# Patient Record
Sex: MALE | Race: ASIAN | NOT HISPANIC OR LATINO | URBAN - METROPOLITAN AREA
[De-identification: names, ages, dates, MRNs, and addresses within clinical notes are randomized per-mention and may not be internally consistent; named-entity substitution may affect disease eponyms.]

---

## 2020-06-12 ENCOUNTER — EMERGENCY (EMERGENCY)
Facility: HOSPITAL | Age: 55
LOS: 1 days | Discharge: HOME | End: 2020-06-12
Attending: EMERGENCY MEDICINE
Payer: MEDICAID

## 2020-06-12 VITALS
RESPIRATION RATE: 19 BRPM | SYSTOLIC BLOOD PRESSURE: 161 MMHG | TEMPERATURE: 98 F | OXYGEN SATURATION: 95 % | HEART RATE: 77 BPM | DIASTOLIC BLOOD PRESSURE: 94 MMHG

## 2020-06-12 VITALS — WEIGHT: 162.04 LBS

## 2020-06-12 LAB
ALBUMIN SERPL ELPH-MCNC: 3.8 G/DL — SIGNIFICANT CHANGE UP (ref 3.5–5)
ALP SERPL-CCNC: 67 U/L — SIGNIFICANT CHANGE UP (ref 40–120)
ALT FLD-CCNC: 105 U/L DA — HIGH (ref 10–60)
ANION GAP SERPL CALC-SCNC: 9 MMOL/L — SIGNIFICANT CHANGE UP (ref 5–17)
AST SERPL-CCNC: 45 U/L — HIGH (ref 10–40)
BASOPHILS # BLD AUTO: 0.03 K/UL — SIGNIFICANT CHANGE UP (ref 0–0.2)
BASOPHILS NFR BLD AUTO: 0.4 % — SIGNIFICANT CHANGE UP (ref 0–2)
BILIRUB SERPL-MCNC: 0.4 MG/DL — SIGNIFICANT CHANGE UP (ref 0.2–1.2)
BUN SERPL-MCNC: 20 MG/DL — HIGH (ref 7–18)
CALCIUM SERPL-MCNC: 8.9 MG/DL — SIGNIFICANT CHANGE UP (ref 8.4–10.5)
CHLORIDE SERPL-SCNC: 107 MMOL/L — SIGNIFICANT CHANGE UP (ref 96–108)
CO2 SERPL-SCNC: 26 MMOL/L — SIGNIFICANT CHANGE UP (ref 22–31)
CREAT SERPL-MCNC: 1.11 MG/DL — SIGNIFICANT CHANGE UP (ref 0.5–1.3)
EOSINOPHIL # BLD AUTO: 0.01 K/UL — SIGNIFICANT CHANGE UP (ref 0–0.5)
EOSINOPHIL NFR BLD AUTO: 0.1 % — SIGNIFICANT CHANGE UP (ref 0–6)
GLUCOSE SERPL-MCNC: 125 MG/DL — HIGH (ref 70–99)
HCT VFR BLD CALC: 46.6 % — SIGNIFICANT CHANGE UP (ref 39–50)
HGB BLD-MCNC: 16.2 G/DL — SIGNIFICANT CHANGE UP (ref 13–17)
IMM GRANULOCYTES NFR BLD AUTO: 0.2 % — SIGNIFICANT CHANGE UP (ref 0–1.5)
LYMPHOCYTES # BLD AUTO: 1.35 K/UL — SIGNIFICANT CHANGE UP (ref 1–3.3)
LYMPHOCYTES # BLD AUTO: 15.8 % — SIGNIFICANT CHANGE UP (ref 13–44)
MCHC RBC-ENTMCNC: 29.7 PG — SIGNIFICANT CHANGE UP (ref 27–34)
MCHC RBC-ENTMCNC: 34.8 GM/DL — SIGNIFICANT CHANGE UP (ref 32–36)
MCV RBC AUTO: 85.5 FL — SIGNIFICANT CHANGE UP (ref 80–100)
MONOCYTES # BLD AUTO: 0.35 K/UL — SIGNIFICANT CHANGE UP (ref 0–0.9)
MONOCYTES NFR BLD AUTO: 4.1 % — SIGNIFICANT CHANGE UP (ref 2–14)
NEUTROPHILS # BLD AUTO: 6.79 K/UL — SIGNIFICANT CHANGE UP (ref 1.8–7.4)
NEUTROPHILS NFR BLD AUTO: 79.4 % — HIGH (ref 43–77)
NRBC # BLD: 0 /100 WBCS — SIGNIFICANT CHANGE UP (ref 0–0)
PLATELET # BLD AUTO: 171 K/UL — SIGNIFICANT CHANGE UP (ref 150–400)
POTASSIUM SERPL-MCNC: 4 MMOL/L — SIGNIFICANT CHANGE UP (ref 3.5–5.3)
POTASSIUM SERPL-SCNC: 4 MMOL/L — SIGNIFICANT CHANGE UP (ref 3.5–5.3)
PROT SERPL-MCNC: 8 G/DL — SIGNIFICANT CHANGE UP (ref 6–8.3)
RBC # BLD: 5.45 M/UL — SIGNIFICANT CHANGE UP (ref 4.2–5.8)
RBC # FLD: 13.1 % — SIGNIFICANT CHANGE UP (ref 10.3–14.5)
SODIUM SERPL-SCNC: 142 MMOL/L — SIGNIFICANT CHANGE UP (ref 135–145)
TROPONIN I SERPL-MCNC: <0.015 NG/ML — SIGNIFICANT CHANGE UP (ref 0–0.04)
WBC # BLD: 8.55 K/UL — SIGNIFICANT CHANGE UP (ref 3.8–10.5)
WBC # FLD AUTO: 8.55 K/UL — SIGNIFICANT CHANGE UP (ref 3.8–10.5)

## 2020-06-12 PROCEDURE — 99283 EMERGENCY DEPT VISIT LOW MDM: CPT

## 2020-06-12 PROCEDURE — 83880 ASSAY OF NATRIURETIC PEPTIDE: CPT

## 2020-06-12 PROCEDURE — 71045 X-RAY EXAM CHEST 1 VIEW: CPT | Mod: 26

## 2020-06-12 PROCEDURE — 82553 CREATINE MB FRACTION: CPT

## 2020-06-12 PROCEDURE — 99283 EMERGENCY DEPT VISIT LOW MDM: CPT | Mod: 25

## 2020-06-12 PROCEDURE — 85027 COMPLETE CBC AUTOMATED: CPT

## 2020-06-12 PROCEDURE — 93010 ELECTROCARDIOGRAM REPORT: CPT

## 2020-06-12 PROCEDURE — 36415 COLL VENOUS BLD VENIPUNCTURE: CPT

## 2020-06-12 PROCEDURE — 94640 AIRWAY INHALATION TREATMENT: CPT

## 2020-06-12 PROCEDURE — 84484 ASSAY OF TROPONIN QUANT: CPT

## 2020-06-12 PROCEDURE — 71045 X-RAY EXAM CHEST 1 VIEW: CPT

## 2020-06-12 PROCEDURE — 93005 ELECTROCARDIOGRAM TRACING: CPT

## 2020-06-12 PROCEDURE — 80053 COMPREHEN METABOLIC PANEL: CPT

## 2020-06-12 RX ORDER — IPRATROPIUM/ALBUTEROL SULFATE 18-103MCG
3 AEROSOL WITH ADAPTER (GRAM) INHALATION ONCE
Refills: 0 | Status: COMPLETED | OUTPATIENT
Start: 2020-06-12 | End: 2020-06-12

## 2020-06-12 RX ADMIN — Medication 3 MILLILITER(S): at 23:32
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
## 2020-06-12 NOTE — ED PROVIDER NOTE - CLINICAL SUMMARY MEDICAL DECISION MAKING FREE TEXT BOX
54 year old male with SOB and CP. vitals WNL. PE as above. 54 year old male with SOB and CP. vitals WNL. PE as above.  ecg no acute ischemic changes. cxr unremarkable. labs unremarkable. given duoneb- feels improved. vitals unremarkable. will dc. f/u with pulm. return precautions discussed.

## 2020-06-12 NOTE — ED PROVIDER NOTE - PATIENT PORTAL LINK FT
You can access the FollowMyHealth Patient Portal offered by [facility] by registering at the following website: [email or web address]. By joining [employer]’s FollowMyHealth portal, you will also be able to view your health information using other applications (apps) compatible with our system.

## 2020-06-12 NOTE — ED PROVIDER NOTE - PROGRESS NOTE DETAILS
feels improved. no wheezing. states that cp/back pain was going to PT for, but hasn't been to PT 2/2 pandemic.

## 2020-06-12 NOTE — ED PROVIDER NOTE - CONSTITUTIONAL, MLM
Form completed and emailed to you    Please have dr Pinedo Safe sign and please fax to the [facility] normal... Well appearing, awake, alert, oriented to person, place, time/situation and in no apparent distress.

## 2020-06-13 LAB
CK MB CFR SERPL CALC: <1 NG/ML — SIGNIFICANT CHANGE UP (ref 0–3.6)
NT-PROBNP SERPL-SCNC: 6 PG/ML — SIGNIFICANT CHANGE UP (ref 0–125)

## 2021-11-18 ENCOUNTER — EMERGENCY (EMERGENCY)
Facility: HOSPITAL | Age: 56
LOS: 1 days | Discharge: HOME | End: 2021-11-18
Attending: EMERGENCY MEDICINE
Payer: MEDICAID

## 2021-11-18 VITALS
TEMPERATURE: 98 F | RESPIRATION RATE: 22 BRPM | HEIGHT: 70 IN | HEART RATE: 75 BPM | OXYGEN SATURATION: 96 % | DIASTOLIC BLOOD PRESSURE: 91 MMHG | WEIGHT: 175.05 LBS | SYSTOLIC BLOOD PRESSURE: 135 MMHG

## 2021-11-18 PROCEDURE — 99284 EMERGENCY DEPT VISIT MOD MDM: CPT

## 2021-11-18 PROCEDURE — 71045 X-RAY EXAM CHEST 1 VIEW: CPT | Mod: 26

## 2021-11-18 PROCEDURE — 99283 EMERGENCY DEPT VISIT LOW MDM: CPT | Mod: 25

## 2021-11-18 PROCEDURE — 71045 X-RAY EXAM CHEST 1 VIEW: CPT

## 2021-11-18 RX ORDER — DEXTROMETHORPHAN HYDROBROMIDE AND PROMETHAZINE HYDROCHLORIDE 15; 6.25 MG/5ML; MG/5ML
5 SYRUP ORAL
Qty: 100 | Refills: 0
Start: 2021-11-18 | End: 2021-11-22

## 2021-11-18 RX ADMIN — Medication 100 MILLIGRAM(S): at 06:36

## 2021-11-18 NOTE — ED PROVIDER NOTE - OBJECTIVE STATEMENT
56 year old male PMH asthma coming in with 10 days of a nonproductive cough. pt saw his PCP and took a full course of azithromyin and prednisone but still with a cough. states has also been using nebulizers at home without improvement of the cough. denies cp, sob, palpitations, abd pains, n/V/d/C, fevers, chills, sweats, uri symptoms. only took 1 dose of cough medication at home.

## 2021-11-18 NOTE — ED PROVIDER NOTE - NSFOLLOWUPINSTRUCTIONS_ED_ALL_ED_FT
Log Out.      Parchment CareNotes®     :  [facility]  	                       ACUTE BRONCHITIS - AfterCare(R) Instructions(ER/ED)           Acute Bronchitis    WHAT YOU NEED TO KNOW:    Acute bronchitis is swelling and irritation in your lungs. It is usually caused by a virus and most often happens in the winter. Bronchitis may also be caused by bacteria or by a chemical irritant, such as smoke.    DISCHARGE INSTRUCTIONS:    Return to the emergency department if:   •You cough up blood.      •Your lips or fingernails turn blue.      •You feel like you are not getting enough air when you breathe.      Call your doctor if:   •Your symptoms do not go away or get worse, even after treatment.      •Your cough does not get better within 4 weeks.      •You have questions or concerns about your condition or care.      Medicines: You may need any of the following:   •Cough suppressants decrease your urge to cough.       •Decongestants help loosen mucus in your lungs and make it easier to cough up. This can help you breathe easier.      •Inhalers may be given. Your healthcare provider may give you one or more inhalers to help you breathe easier and cough less. An inhaler gives your medicine to open your airways. Ask your healthcare provider to show you how to use your inhaler correctly.  Metered Dose Inhaler           •Antibiotics may be given for up to 5 days if your bronchitis is caused by bacteria.      •Acetaminophen decreases pain and fever. It is available without a doctor's order. Ask how much to take and how often to take it. Follow directions. Read the labels of all other medicines you are using to see if they also contain acetaminophen, or ask your doctor or pharmacist. Acetaminophen can cause liver damage if not taken correctly. Do not use more than 4 grams (4,000 milligrams) total of acetaminophen in one day.       •NSAIDs help decrease swelling and pain or fever. This medicine is available with or without a doctor's order. NSAIDs can cause stomach bleeding or kidney problems in certain people. If you take blood thinner medicine, always ask your healthcare provider if NSAIDs are safe for you. Always read the medicine label and follow directions.      •Take your medicine as directed. Contact your healthcare provider if you think your medicine is not helping or if you have side effects. Tell him of her if you are allergic to any medicine. Keep a list of the medicines, vitamins, and herbs you take. Include the amounts, and when and why you take them. Bring the list or the pill bottles to follow-up visits. Carry your medicine list with you in case of an emergency.      Self-care:   •Drink liquids as directed. You may need to drink more liquids than usual to stay hydrated. Ask how much liquid to drink each day and which liquids are best for you.      •Use a cool mist humidifier to increase air moisture in your home. This may make it easier for you to breathe and help decrease your cough.       •Get more rest. Rest helps your body to heal. Slowly start to do more each day. Rest when you feel it is needed.      •Avoid irritants in the air. Avoid chemicals, fumes, and dust. Wear a face mask if you must work around dust or fumes. Stay inside on days when air pollution levels are high. If you have allergies, stay inside when pollen counts are high. Do not use aerosol products, such as spray-on deodorant, bug spray, and hair spray.      •Do not smoke or be around others who are smoking. Nicotine and other chemicals in cigarettes and cigars can cause lung damage. Ask your healthcare provider for information if you currently smoke and need help to quit. E-cigarettes or smokeless tobacco still contain nicotine. Talk to your healthcare provider before you use these products.       Prevent acute bronchitis:          •Ask about vaccines you may need. Get a flu vaccine each year as soon as recommended, usually in September or October. Ask your healthcare provider if you should also get a pneumonia or COVID-19 vaccine. Your healthcare provider can tell you if you should also get other vaccines, and when to get them.      •Prevent the spread of germs. You can decrease your risk for acute bronchitis and other illnesses by doing the following: ?Wash your hands often with soap and water. Carry germ-killing hand lotion or gel with you. You can use the lotion or gel to clean your hands when soap and water are not available.  Handwashing           ?Do not touch your eyes, nose, or mouth unless you have washed your hands first.      ?Always cover your mouth when you cough to prevent the spread of germs. It is best to cough into a tissue or your shirt sleeve instead of into your hand. Ask those around you to cover their mouths when they cough.      ?Try to avoid people who have a cold or the flu. If you are sick, stay away from others as much as possible.        Follow up with your doctor as directed: Write down questions you have so you will remember to ask them during your follow-up visits.       © Copyright [employer] 2021           back to top                          © Copyright [employer] 2021

## 2021-11-18 NOTE — ED PROVIDER NOTE - PROGRESS NOTE DETAILS
cxr no infiltrate. likely bronchitis. will dc with cough med. f/u with PMD. return precautions discussed.

## 2021-11-18 NOTE — ED PROVIDER NOTE - PATIENT PORTAL LINK FT
You can access the FollowMyHealth Patient Portal offered by [facility] by registering at the following website: [email or web address]. By joining [employer]’s FollowMyHealth portal, you will also be able to view your health information using other applications (apps) compatible with our system.

## 2023-06-30 PROBLEM — Z00.00 ENCOUNTER FOR PREVENTIVE HEALTH EXAMINATION: Status: ACTIVE | Noted: 2023-06-30

## 2023-07-06 ENCOUNTER — APPOINTMENT (OUTPATIENT)
Dept: RADIOLOGY | Facility: IMAGING CENTER | Age: 58
End: 2023-07-06
Payer: MEDICAID

## 2023-07-06 ENCOUNTER — OUTPATIENT (OUTPATIENT)
Dept: OUTPATIENT SERVICES | Facility: HOSPITAL | Age: 58
LOS: 1 days | End: 2023-07-06
Payer: MEDICAID

## 2023-07-06 DIAGNOSIS — R05.9 COUGH, UNSPECIFIED: ICD-10-CM

## 2023-07-06 PROCEDURE — 71046 X-RAY EXAM CHEST 2 VIEWS: CPT

## 2023-07-06 PROCEDURE — 71046 X-RAY EXAM CHEST 2 VIEWS: CPT | Mod: 26

## 2023-07-14 ENCOUNTER — LABORATORY RESULT (OUTPATIENT)
Age: 58
End: 2023-07-14

## 2023-07-14 ENCOUNTER — APPOINTMENT (OUTPATIENT)
Dept: PULMONOLOGY | Facility: CLINIC | Age: 58
End: 2023-07-14
Payer: MEDICAID

## 2023-07-14 VITALS
OXYGEN SATURATION: 98 % | HEART RATE: 72 BPM | HEIGHT: 69 IN | SYSTOLIC BLOOD PRESSURE: 116 MMHG | WEIGHT: 176 LBS | DIASTOLIC BLOOD PRESSURE: 75 MMHG | BODY MASS INDEX: 26.07 KG/M2

## 2023-07-14 PROCEDURE — 94726 PLETHYSMOGRAPHY LUNG VOLUMES: CPT

## 2023-07-14 PROCEDURE — 99203 OFFICE O/P NEW LOW 30 MIN: CPT | Mod: 25

## 2023-07-14 PROCEDURE — ZZZZZ: CPT

## 2023-07-14 PROCEDURE — 94729 DIFFUSING CAPACITY: CPT

## 2023-07-14 PROCEDURE — 94060 EVALUATION OF WHEEZING: CPT

## 2023-07-14 RX ORDER — AZELASTINE HYDROCHLORIDE 205.5 UG/1
0.15 SPRAY, METERED NASAL
Qty: 1 | Refills: 6 | Status: ACTIVE | COMMUNITY
Start: 2023-07-14 | End: 1900-01-01

## 2023-07-14 RX ORDER — FLUTICASONE PROPIONATE 50 UG/1
50 SPRAY, METERED NASAL TWICE DAILY
Qty: 1 | Refills: 3 | Status: ACTIVE | COMMUNITY
Start: 2023-07-14 | End: 1900-01-01

## 2023-07-17 LAB
A ALTERNATA IGE QN: <0.1 KUA/L
A FUMIGATUS IGE QN: <0.1 KUA/L
C ALBICANS IGE QN: <0.1 KUA/L
C HERBARUM IGE QN: <0.1 KUA/L
CAT DANDER IGE QN: <0.1 KUA/L
COMMON RAGWEED IGE QN: <0.1 KUA/L
D FARINAE IGE QN: <0.1 KUA/L
D PTERONYSS IGE QN: <0.1 KUA/L
DEPRECATED A ALTERNATA IGE RAST QL: 0
DEPRECATED A FUMIGATUS IGE RAST QL: 0
DEPRECATED C ALBICANS IGE RAST QL: 0
DEPRECATED C HERBARUM IGE RAST QL: 0
DEPRECATED CAT DANDER IGE RAST QL: 0
DEPRECATED COMMON RAGWEED IGE RAST QL: 0
DEPRECATED D FARINAE IGE RAST QL: 0
DEPRECATED D PTERONYSS IGE RAST QL: 0
DEPRECATED DOG DANDER IGE RAST QL: 0
DEPRECATED M RACEMOSUS IGE RAST QL: 0
DEPRECATED ROACH IGE RAST QL: NORMAL
DEPRECATED TIMOTHY IGE RAST QL: 0
DEPRECATED WHITE OAK IGE RAST QL: 0
DOG DANDER IGE QN: <0.1 KUA/L
IGE SER-MCNC: 48 KU/L
M RACEMOSUS IGE QN: <0.1 KUA/L
ROACH IGE QN: 0.23 KUA/L
TIMOTHY IGE QN: <0.1 KUA/L
WHITE OAK IGE QN: <0.1 KUA/L

## 2023-07-17 NOTE — HISTORY OF PRESENT ILLNESS
[TextBox_4] : 57 -year-old male with a cough pretty well only nocturnal and associated with throat clearing. The patient has asthma and multiple allergies and is currently on Advair and albuterol. Despite taking his medications, the cough has persisted. He denies any dyspnea, wheezing or chest discomfort. He denies any recent fevers. He is a nonsmoker

## 2023-07-17 NOTE — ASSESSMENT
[FreeTextEntry1] : The patient's lung functions confirm that he has asthma. The cough has all the characteristics of an upper airway cough syndrome. I suggested nasal fluticasone and nasal astelin Each to be used 2 inhalations each nostril twice a day for the next 4 weeks. He knows that if the cough persists after that to make an appointment to come in again.

## 2023-07-24 ENCOUNTER — APPOINTMENT (OUTPATIENT)
Dept: OTOLARYNGOLOGY | Facility: CLINIC | Age: 58
End: 2023-07-24
Payer: MEDICAID

## 2023-07-24 VITALS
DIASTOLIC BLOOD PRESSURE: 80 MMHG | SYSTOLIC BLOOD PRESSURE: 115 MMHG | BODY MASS INDEX: 25.48 KG/M2 | HEIGHT: 69 IN | WEIGHT: 172 LBS | HEART RATE: 79 BPM

## 2023-07-24 DIAGNOSIS — Z86.79 PERSONAL HISTORY OF OTHER DISEASES OF THE CIRCULATORY SYSTEM: ICD-10-CM

## 2023-07-24 DIAGNOSIS — Z87.09 PERSONAL HISTORY OF OTHER DISEASES OF THE RESPIRATORY SYSTEM: ICD-10-CM

## 2023-07-24 PROCEDURE — 31579 LARYNGOSCOPY TELESCOPIC: CPT

## 2023-07-24 PROCEDURE — 99204 OFFICE O/P NEW MOD 45 MIN: CPT | Mod: 25

## 2023-07-24 RX ORDER — BISOPROLOL FUMARATE 5 MG/1
TABLET, FILM COATED ORAL
Refills: 0 | Status: ACTIVE | COMMUNITY

## 2023-07-24 RX ORDER — MONTELUKAST SODIUM 10 MG/1
TABLET, FILM COATED ORAL
Refills: 0 | Status: ACTIVE | COMMUNITY

## 2023-07-24 RX ORDER — METOCLOPRAMIDE 10 MG/1
10 TABLET ORAL
Refills: 0 | Status: ACTIVE | COMMUNITY

## 2023-07-24 RX ORDER — ALBUTEROL SULFATE 2.5 MG/3ML
(2.5 MG/3ML) SOLUTION RESPIRATORY (INHALATION)
Refills: 0 | Status: ACTIVE | COMMUNITY

## 2023-07-24 NOTE — PROCEDURE
[de-identified] : Stroboscopic Laryngoscopy Procedure Note: \par Indication:	Assess laryngeal biomechanics and vocal fold oscillation. \par Description of Procedure:	Informed consent was verbally obtained from the patient prior to the procedure. The patient was seated in the clinic chair. Topical anesthesia was achieved by first spraying the nasal cavities with 4% lidocaine and nasal decongestant. \par \par Findings: \par Supraglottis: no masses or lesions \par Glottis:    Structure: \par                       Right: scatted white lesions along vibratory edge to posterior commissure\par                       Left:  scatted white lesions along vibratory edge to posterior commissure\par                Mobility: \par                       Right:  normal \par                       Left:  normal\par                Amplitude: \par                       Right:  decreased\par                       Left:  decreased\par                Closure: complete \par                Wave symmetry:  symmetric \par Subglottis: no masses or lesions within the visualized subglottis Visualized airway is widely patent.

## 2023-07-24 NOTE — HISTORY OF PRESENT ILLNESS
[de-identified] : NIKOLAY FERNANDEZ is a 57 year old male who is coming to the [facility] for evaluation of a chronic cough. Also complains of ear nose and throat itching, hx of asthma.\par They note a cough for the past 4-5 months\par Specific triggers for the cough include: itching throat \par They do not note severe paroxysms of cough. \par They do note coughing as they lie down for bed.  \par They do note being awakened from sleep by this cough frequently.  \par They do not note specific difficulties with swallowing. \par They do note changes in their voice - occasional hoarse with low tone\par They do note problems with breathing - has asthma\par They do note frequent classic heartburn symptoms - has had endoscopy in the past\par They are not currently smoking.   \par They have tried the following meds in an attempt to treat the cough: forgot the name of meds\par They are not maintained on an ACE inhibitor. \par Currently on Advair 1 puff bid and albuterol PRN.\par 15 year smoking hx, 15 oack years, quit in 2000\par 2 months ago was treated for oral candidiasis with medication he does not know name of.  \par \par PREVIOUS STUDIES: \par CXR 7/6/2023: normal

## 2023-07-24 NOTE — ASSESSMENT
[FreeTextEntry1] : Assessment/Plan:\par #1 Chronic cough\par #2 Fungal laryngitis\par #3 Dysphonia\par \par Patient to start fluconazole daily for 14 days and follow up with me afterwards.  If fungus gone but cough persists we will start my chronic cough protocol at that time.  \par \par

## 2023-08-15 ENCOUNTER — APPOINTMENT (OUTPATIENT)
Dept: OTOLARYNGOLOGY | Facility: CLINIC | Age: 58
End: 2023-08-15
Payer: MEDICAID

## 2023-08-15 VITALS
BODY MASS INDEX: 23.8 KG/M2 | TEMPERATURE: 98 F | WEIGHT: 170 LBS | OXYGEN SATURATION: 97 % | DIASTOLIC BLOOD PRESSURE: 81 MMHG | SYSTOLIC BLOOD PRESSURE: 123 MMHG | HEIGHT: 71 IN | RESPIRATION RATE: 18 BRPM | HEART RATE: 77 BPM

## 2023-08-15 PROCEDURE — 99214 OFFICE O/P EST MOD 30 MIN: CPT | Mod: 25

## 2023-08-15 PROCEDURE — 31579 LARYNGOSCOPY TELESCOPIC: CPT

## 2023-08-15 RX ORDER — FLUCONAZOLE 200 MG/1
200 TABLET ORAL
Qty: 14 | Refills: 0 | Status: COMPLETED | COMMUNITY
Start: 2023-07-24 | End: 2023-08-15

## 2023-08-15 NOTE — ASSESSMENT
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  I also recommend we proceed forward with CT chest.  I will call him with any concerning findings.  I will follow up with him in 1 month.  For his voice I have also recommended voice therapy.  He is in agreement with plan.

## 2023-08-15 NOTE — REASON FOR VISIT
[Subsequent Evaluation] : a subsequent evaluation for [FreeTextEntry2] :  chronic cough, fungal laryngitis, and dysphonia

## 2023-08-15 NOTE — HISTORY OF PRESENT ILLNESS
[de-identified] : NIKOLAY FERNANDEZ is a 57 year year old male who presents to the [facility] for follow up of his chronic cough, fungal laryngitis, and dysphonia.  I last saw the patient on 7/24/23. At that time I recommended they start fluconazole daily for 14 days and follow up with me afterwards. If fungus gone but cough persists we would start my chronic cough protocol at that time. Reports right sided throat itchiness States cough is 70% better and NO longer vomiting after coughing. However cough was fully gone after 1 week of fluconazole and restarted  Complete course of Fluconazole. Currently on Advair 1 puff bid and albuterol PRN. Reports dry throat and occasional pain while speaking  Voice remains hoarse and raspy.  Reports right ear fullness and ear itch.  Patient denies dysphagia, odynophagia, dyspnea and fevers.  Patient denies otalgia, otorrhea, ear infections, hearing loss, tinnitus, dizziness, vertigo, headaches related to hearing.   Previously reported: evaluation of a chronic cough. Also complains of ear nose and throat itching, hx of asthma. They note a cough for the past 4-5 months Specific triggers for the cough include: itching throat They do not note severe paroxysms of cough. They do note coughing as they lie down for bed. They do note being awakened from sleep by this cough frequently. They do not note specific difficulties with swallowing. They do note changes in their voice - occasional hoarse with low tone They do note problems with breathing - has asthma They do note frequent classic heartburn symptoms - has had endoscopy in the past They are not currently smoking. They have tried the following meds in an attempt to treat the cough: forgot the name of meds They are not maintained on an ACE inhibitor. Currently on Advair 1 puff bid and albuterol PRN. 15 year smoking hx, 15 oack years, quit in 2000 2 months ago was treated for oral candidiasis with medication he does not know name of.  PREVIOUS STUDIES: CXR 7/6/2023: normal

## 2023-08-15 NOTE — PROCEDURE
[de-identified] : Stroboscopic Laryngoscopy Procedure Note:  Indication:	Assess laryngeal biomechanics and vocal fold oscillation.  Description of Procedure:	Informed consent was verbally obtained from the patient prior to the procedure. The patient was seated in the clinic chair. Topical anesthesia was achieved by first spraying the nasal cavities with 4% lidocaine and nasal decongestant.   Findings:  Supraglottis: no masses or lesions  Glottis:    Structure:                        Right: crisp and shows no lesions or masses                        Left:  crisp and shows no lesions or masses, possible mid fold scar                Mobility:                        Right:  normal                        Left:  normal                Amplitude:                        Right:  normal                       Left:  normal                Closure: complete                 Wave symmetry:  asymmetric  Subglottis: no masses or lesions within the visualized subglottis Visualized airway is widely patent. Other: Severe MTD with intermittent supraglottic phonation

## 2023-08-28 ENCOUNTER — APPOINTMENT (OUTPATIENT)
Dept: CT IMAGING | Facility: IMAGING CENTER | Age: 58
End: 2023-08-28
Payer: MEDICAID

## 2023-08-28 ENCOUNTER — OUTPATIENT (OUTPATIENT)
Dept: OUTPATIENT SERVICES | Facility: HOSPITAL | Age: 58
LOS: 1 days | End: 2023-08-28
Payer: MEDICAID

## 2023-08-28 DIAGNOSIS — R05.3 CHRONIC COUGH: ICD-10-CM

## 2023-08-28 PROCEDURE — 71250 CT THORAX DX C-: CPT

## 2023-08-28 PROCEDURE — 71250 CT THORAX DX C-: CPT | Mod: 26

## 2023-09-16 ENCOUNTER — INPATIENT (INPATIENT)
Facility: HOSPITAL | Age: 58
LOS: 1 days | Discharge: HOME | DRG: 871 | End: 2023-09-18
Attending: STUDENT IN AN ORGANIZED HEALTH CARE EDUCATION/TRAINING PROGRAM | Admitting: STUDENT IN AN ORGANIZED HEALTH CARE EDUCATION/TRAINING PROGRAM
Payer: MEDICAID

## 2023-09-16 VITALS
TEMPERATURE: 99 F | HEIGHT: 70 IN | OXYGEN SATURATION: 95 % | SYSTOLIC BLOOD PRESSURE: 115 MMHG | RESPIRATION RATE: 20 BRPM | HEART RATE: 100 BPM | DIASTOLIC BLOOD PRESSURE: 77 MMHG | WEIGHT: 167.11 LBS

## 2023-09-16 DIAGNOSIS — J45.909 UNSPECIFIED ASTHMA, UNCOMPLICATED: ICD-10-CM

## 2023-09-16 DIAGNOSIS — E11.9 TYPE 2 DIABETES MELLITUS WITHOUT COMPLICATIONS: ICD-10-CM

## 2023-09-16 DIAGNOSIS — Z29.9 ENCOUNTER FOR PROPHYLACTIC MEASURES, UNSPECIFIED: ICD-10-CM

## 2023-09-16 DIAGNOSIS — J18.9 PNEUMONIA, UNSPECIFIED ORGANISM: ICD-10-CM

## 2023-09-16 LAB
ALBUMIN SERPL ELPH-MCNC: 3.5 G/DL — SIGNIFICANT CHANGE UP (ref 3.5–5)
ALP SERPL-CCNC: 98 U/L — SIGNIFICANT CHANGE UP (ref 40–120)
ALT FLD-CCNC: 142 U/L DA — HIGH (ref 10–60)
ANION GAP SERPL CALC-SCNC: 10 MMOL/L — SIGNIFICANT CHANGE UP (ref 5–17)
APTT BLD: 35.2 SEC — SIGNIFICANT CHANGE UP (ref 24.5–35.6)
AST SERPL-CCNC: 32 U/L — SIGNIFICANT CHANGE UP (ref 10–40)
BASOPHILS # BLD AUTO: 0.04 K/UL — SIGNIFICANT CHANGE UP (ref 0–0.2)
BASOPHILS NFR BLD AUTO: 0.2 % — SIGNIFICANT CHANGE UP (ref 0–2)
BILIRUB SERPL-MCNC: 1.4 MG/DL — HIGH (ref 0.2–1.2)
BUN SERPL-MCNC: 16 MG/DL — SIGNIFICANT CHANGE UP (ref 7–18)
CALCIUM SERPL-MCNC: 9.1 MG/DL — SIGNIFICANT CHANGE UP (ref 8.4–10.5)
CHLORIDE SERPL-SCNC: 97 MMOL/L — SIGNIFICANT CHANGE UP (ref 96–108)
CO2 SERPL-SCNC: 28 MMOL/L — SIGNIFICANT CHANGE UP (ref 22–31)
CREAT SERPL-MCNC: 1.32 MG/DL — HIGH (ref 0.5–1.3)
EGFR: 63 ML/MIN/1.73M2 — SIGNIFICANT CHANGE UP
EOSINOPHIL # BLD AUTO: 0.02 K/UL — SIGNIFICANT CHANGE UP (ref 0–0.5)
EOSINOPHIL NFR BLD AUTO: 0.1 % — SIGNIFICANT CHANGE UP (ref 0–6)
GLUCOSE SERPL-MCNC: 364 MG/DL — HIGH (ref 70–99)
HCT VFR BLD CALC: 47.1 % — SIGNIFICANT CHANGE UP (ref 39–50)
HGB BLD-MCNC: 16.3 G/DL — SIGNIFICANT CHANGE UP (ref 13–17)
HIV 1 & 2 AB SERPL IA.RAPID: SIGNIFICANT CHANGE UP
IMM GRANULOCYTES NFR BLD AUTO: 0.4 % — SIGNIFICANT CHANGE UP (ref 0–0.9)
INR BLD: 1.22 RATIO — HIGH (ref 0.85–1.18)
LACTATE SERPL-SCNC: 1.7 MMOL/L — SIGNIFICANT CHANGE UP (ref 0.7–2)
LYMPHOCYTES # BLD AUTO: 1.85 K/UL — SIGNIFICANT CHANGE UP (ref 1–3.3)
LYMPHOCYTES # BLD AUTO: 11.3 % — LOW (ref 13–44)
MAGNESIUM SERPL-MCNC: 2.1 MG/DL — SIGNIFICANT CHANGE UP (ref 1.6–2.6)
MCHC RBC-ENTMCNC: 29.4 PG — SIGNIFICANT CHANGE UP (ref 27–34)
MCHC RBC-ENTMCNC: 34.6 GM/DL — SIGNIFICANT CHANGE UP (ref 32–36)
MCV RBC AUTO: 85 FL — SIGNIFICANT CHANGE UP (ref 80–100)
MONOCYTES # BLD AUTO: 1.05 K/UL — HIGH (ref 0–0.9)
MONOCYTES NFR BLD AUTO: 6.4 % — SIGNIFICANT CHANGE UP (ref 2–14)
NEUTROPHILS # BLD AUTO: 13.27 K/UL — HIGH (ref 1.8–7.4)
NEUTROPHILS NFR BLD AUTO: 81.6 % — HIGH (ref 43–77)
NRBC # BLD: 0 /100 WBCS — SIGNIFICANT CHANGE UP (ref 0–0)
PLATELET # BLD AUTO: 145 K/UL — LOW (ref 150–400)
POTASSIUM SERPL-MCNC: 3.6 MMOL/L — SIGNIFICANT CHANGE UP (ref 3.5–5.3)
POTASSIUM SERPL-SCNC: 3.6 MMOL/L — SIGNIFICANT CHANGE UP (ref 3.5–5.3)
PROT SERPL-MCNC: 8.2 G/DL — SIGNIFICANT CHANGE UP (ref 6–8.3)
PROTHROM AB SERPL-ACNC: 13.8 SEC — HIGH (ref 9.5–13)
RAPID RVP RESULT: SIGNIFICANT CHANGE UP
RBC # BLD: 5.54 M/UL — SIGNIFICANT CHANGE UP (ref 4.2–5.8)
RBC # FLD: 11.9 % — SIGNIFICANT CHANGE UP (ref 10.3–14.5)
SARS-COV-2 RNA SPEC QL NAA+PROBE: SIGNIFICANT CHANGE UP
SODIUM SERPL-SCNC: 135 MMOL/L — SIGNIFICANT CHANGE UP (ref 135–145)
TROPONIN I, HIGH SENSITIVITY RESULT: 20.5 NG/L — SIGNIFICANT CHANGE UP
WBC # BLD: 16.3 K/UL — HIGH (ref 3.8–10.5)
WBC # FLD AUTO: 16.3 K/UL — HIGH (ref 3.8–10.5)

## 2023-09-16 PROCEDURE — 99223 1ST HOSP IP/OBS HIGH 75: CPT | Mod: GC

## 2023-09-16 PROCEDURE — 71250 CT THORAX DX C-: CPT | Mod: 26,MA

## 2023-09-16 PROCEDURE — 99285 EMERGENCY DEPT VISIT HI MDM: CPT

## 2023-09-16 PROCEDURE — 71045 X-RAY EXAM CHEST 1 VIEW: CPT | Mod: 26

## 2023-09-16 RX ORDER — IPRATROPIUM/ALBUTEROL SULFATE 18-103MCG
3 AEROSOL WITH ADAPTER (GRAM) INHALATION
Refills: 0 | Status: DISCONTINUED | OUTPATIENT
Start: 2023-09-16 | End: 2023-09-18

## 2023-09-16 RX ORDER — ACETAMINOPHEN 500 MG
650 TABLET ORAL ONCE
Refills: 0 | Status: COMPLETED | OUTPATIENT
Start: 2023-09-16 | End: 2023-09-16

## 2023-09-16 RX ORDER — AZITHROMYCIN 500 MG/1
500 TABLET, FILM COATED ORAL ONCE
Refills: 0 | Status: COMPLETED | OUTPATIENT
Start: 2023-09-16 | End: 2023-09-16

## 2023-09-16 RX ORDER — SODIUM CHLORIDE 9 MG/ML
2300 INJECTION INTRAMUSCULAR; INTRAVENOUS; SUBCUTANEOUS ONCE
Refills: 0 | Status: COMPLETED | OUTPATIENT
Start: 2023-09-16 | End: 2023-09-16

## 2023-09-16 RX ORDER — CEFTRIAXONE 500 MG/1
1000 INJECTION, POWDER, FOR SOLUTION INTRAMUSCULAR; INTRAVENOUS ONCE
Refills: 0 | Status: COMPLETED | OUTPATIENT
Start: 2023-09-16 | End: 2023-09-16

## 2023-09-16 RX ADMIN — AZITHROMYCIN 255 MILLIGRAM(S): 500 TABLET, FILM COATED ORAL at 21:33

## 2023-09-16 RX ADMIN — SODIUM CHLORIDE 2300 MILLILITER(S): 9 INJECTION INTRAMUSCULAR; INTRAVENOUS; SUBCUTANEOUS at 19:01

## 2023-09-16 RX ADMIN — Medication 125 MILLIGRAM(S): at 21:33

## 2023-09-16 RX ADMIN — Medication 650 MILLIGRAM(S): at 19:01

## 2023-09-16 RX ADMIN — Medication 3 MILLILITER(S): at 21:47

## 2023-09-16 RX ADMIN — CEFTRIAXONE 100 MILLIGRAM(S): 500 INJECTION, POWDER, FOR SOLUTION INTRAMUSCULAR; INTRAVENOUS at 21:33

## 2023-09-16 NOTE — H&P ADULT - PROBLEM SELECTOR PLAN 2
- Patient takes  at home  - will hold home medications  - will start sliding scale  - f/u HgA1c  - diabetic diet - Patient takes glipizide at home  - will hold home medications  - will start sliding scale  - f/u HgA1c  - diabetic diet Pt w/ SCr 1.3  on admission  Baseline SCr unknown  F/U Urine Lytes, calculate FeNa  IVF for now, follow BMP daily

## 2023-09-16 NOTE — H&P ADULT - PROBLEM SELECTOR PLAN 4
dvt ppx: lovenox hx of GERD takes omeprazole   c/w PPI h/o HTN on bisoprolol  Monitor BP  hold off  home meds due to infection   can resume as needed

## 2023-09-16 NOTE — ED PROVIDER NOTE - OBJECTIVE STATEMENT
57 y.o. male with h/o asthma, NIDDM, last dose today, "gas problem", pt had EGD 3 mos ago-told with GERD, was given Prilosec, pt c/o fever, chills, coughing up brownish sputum, wheezing, sob, lack of taste, myalgia, weakness, no n/v, pt took tylenol, last dose this am, no sick contact, recent traveling 57 y.o. male with h/o asthma, NIDDM, last dose today, "gas problem", pt had EGD 3 mos ago-told with GERD, was given Prilosec, pt c/o fever, chills, coughing up brownish sputum, wheezing, sob, lack of taste, myalgia, weakness x5 days, no n/v, pt took tylenol, last dose this am, no sick contact, recent traveling

## 2023-09-16 NOTE — H&P ADULT - PROBLEM SELECTOR PLAN 3
- hx of asthma   - mild B/L wheezing on exam  - c/w Singulair  CT Chest shows New nodular patchy opacities in both lower lobes and right middle lobe, h/o HTN on bisoprolol  Monitor BP  hold off  home meds due to infection   can resume as needed - Patient takes glipizide at home  - will hold home medications  - will start sliding scale  - f/u HgA1c  - diabetic diet

## 2023-09-16 NOTE — ED PROVIDER NOTE - CROS ED GI ALL NEG
Called ED for report    TRANSFER - IN REPORT:    Verbal report received from hayden (name) on Mikey Ohs  being received from ED (unit) for routine progression of care      Report consisted of patients Situation, Background, Assessment and   Recommendations(SBAR). Information from the following report(s) SBAR was reviewed with the receiving nurse. Opportunity for questions and clarification was provided. Assessment completed upon patients arrival to unit and care assumed. negative...

## 2023-09-16 NOTE — H&P ADULT - HISTORY OF PRESENT ILLNESS
57 y.o. male from home with h/o asthma, NIDDM, HTN, GERD, recent candida infection of the throat presents to the ED c/o  fever, chills, coughing up brownish sputum, wheezing, sob, lack of taste, myalgia, weakness x5 days, pt had EGD 3 mos ago-told with GERD, was given Prilosec, Pt denies HA, dizziness, N,V,D, abd pain.

## 2023-09-16 NOTE — H&P ADULT - PROBLEM SELECTOR PROBLEM 7
[place] [facility] Progress Note  Patient: Chriss Alcaraz Date: 2021   : 1953 Attending: Robbi Ordonez MD         Admission date: 2021    ICU admit date:  2021  Intubation date:  2021    Chief Complaint: Abdominal pain    Chriss Alcaraz is a 67 year old male with history of DM2, CKD, L kidney cancer s/p L nephrectomy, HTN, CAD s/p stents, PAD and recent perforated diverticulitis with bowel resection and colostomy in 2021 in [place]. He presented to ED  with diffuse abdominal pain. CT abdomen with pneumatosis small bowel thickening. Emergently taken for exploratory laparotomy small bowel resection and colostomy revision with Dr. Quesada. Post-operatively, was admitted to SICU early morning . Became profoundly hypotensive with mixed hypovolemic/septic shock. Appeared volume deplete on POCUS. Rising lactic up to 4.7. Responded to aggressive volume resuscitation. Stoma dusky in setting of hypotension, returned to OR  for ostomy revision.     24hr Events: No overnight events.      Impression:  -- Left retroperitoneal large abscess, residual diverticulitis, no jose maria perforation   -- S/p Ex-lap, small bowel resection and colostomy revision    -- s/p exp lap with ostomy revision   -- Acute post-operative pain   -- Acute post-operative blood loss anemia  -- Recent diverticulitis complicated with perforation   -- S/p bowel resection and colostomy 2021 in [place]  -- CKD 2  -- CAD, GOOD to LAD and angioplasty Cx 10/2019  -- Pericardial effusion noted on bedside US 2021  -- HTN  -- H/o Malignant neoplasm left kidney   -- S/p left nephrectomy  -- Diabetes mellitus  -- Splenomegaly    DISCUSSION/PLAN  Neuro: Intact. Reports pain controlled.   -- Dilaudid PCA for pain control  -- PTA clonazepam QHS and Requip on hold, resume when able  -- PT/OT - increase activity     Pulmonary: Saturating well on RA.   -- Pulmonary hygiene: IS/cough and deep breathe    CV:  Normotensive - hypertensive. SR, ST at times. Increased tachycardia due to low intravascular volume state and pain. Mild intermittent dizziness, no obvious orthostasis.   -- PRN hydralazine or labetalol to keep SBP < 160.   -- PTA bASA and statin on hold, resume when able     Renal: Mild acute kidney injury, prerenal. SCr trending down. Remains total volume overloaded with intravascular depletion.   -- SPA x 2 now  -- Lasix x 1 dose   -- Hold PTA lasix and hydrochlorothiazide  -- DC moffett     GI: Ostomy pink and viable, small amount of serosanguious return. No flatus. Denies nausea. NG discontinued 7/23.   -- NPO  -- Relistor x1  -- Surgery team following     Heme: H/H down essentially unchanged from yesterday. Thrombocytopenia ongoing, likely consumptive secondary to surgery and shock + hx of splenomegaly.     Endocrine: Essentially euglycemic, intrinsically maintained within ICU target range.   -- Hold metformin     ID: Afebrile. No leukocytosis. Abdominal cultures with enterococcus faecalis and E. Coli.  VRE swab negative.  -- Pip/tazo x 7d course    Disposition: Patient medically stable for transfer out of Intensive Care. Summary of hospitalization and active problems given to Edith CABRERA on 7/24/2021, Dr. Finney to take over as attending upon transfer out of ICU.   .    PERTINENT DIAGNOSTICS/PROCEDURES:  CT Abdomen Pelvis 7/18: Consistent with bowel perforation.      BEST PRACTICES:  - VTE prophylaxis: SQH  - SUP: Pepcid  - LDA: R IJ (7/19), PIV  - Nutrition: NPO  - Therapy/mobilization: PT/OT  - Goals of care note documented: 7/19    ================================================================    Subjective: Pain controlled with PCA, slept well.    I/O last 3 completed shifts:  In: 1893.9 [I.V.:942.9; IV Piggyback:951]  Out: 1255 [Urine:885; Drains:130; Stool:240]  I/O this shift:  In: -   Out: 150 [Urine:150]    Vital Last Value 24 Hour Range   Temperature 98.3 °F (36.8 °C) (07/24/21 0746) Temp  Min: 97.8  °F (36.6 °C)  Max: 98.4 °F (36.9 °C)   Pulse 90 (07/24/21 0800) Pulse  Min: 90  Max: 151   Respiratory 12 (07/24/21 0800) Resp  Min: 11  Max: 30   Non-Invasive  Blood Pressure (!) 155/84 (07/23/21 1630) BP  Min: 155/84  Max: 176/100   Pulse Oximetry 99 % (07/24/21 0800) SpO2  Min: 91 %  Max: 100 %   Arterial   Blood Pressure (!) 154/67 (07/24/21 0800) Arterial Line BP  Min: 103/99  Max: 182/78        Physical Exam:  General: Pleasant male. Up in chair. NAD.   Neuro: Alert and oriented. SINGER, strength 5/5 and symmetric throughout extremities.   HEENT: Sclera clear, moist mucus membranes  Neck: Supple, trachea midline  Chest: Respirations non-labored. Breath sounds clear to auscultation.   Heart: RRR. S1S2, no m/g/r  Abdomen: Semi-firm, tender to palpation to LLQ. Bowel sounds hypoactive. Ostomy pink and viable. Abdominal incision with dressing CDI. JEY drains x 2 with minimal serosanguinous drainage.   Extremities: Warm. No cyanosis. Generalized edema. Pulses palpable and symmetric throughout extremities.   Skin: Warm, dry. No rash or lesion.       Pertinent Reviewed: Allergies, Medications, Labs, Imaging and Physician and Nursing Notes    Video  utilized for above interview and assessment.    ACCS Attestation  This patient is critically ill as documented above. I evaluated the patient and reviewed imaging and laboratory data. I discussed events and interventions with Rico Lee MD  who has reviewed the diagnostic and treatment strategy.. Critical care services I provided [id number] ([facility] hospital care, level III).      Zoey Mcintyre PA-C  [place] [facility]  [phone]     Prophylactic measure Oral candida

## 2023-09-16 NOTE — H&P ADULT - NSHPPHYSICALEXAM_GEN_ALL_CORE
GENERAL: NAD, well-groomed, well-developed  HEAD:  Atraumatic, Normocephalic  EYES: EOMI, PERRLA, conjunctiva and sclera clear  ENMT: No tonsillar erythema, exudates, or enlargement; Moist mucous membranes, Good dentition, No lesions  NECK: Supple, normal appearance, No JVD; Normal thyroid; Trachea midline  NERVOUS SYSTEM:  Alert & Oriented X3,  Motor Strength 5/5 B/L upper and lower extremities, sensation intact  CHEST/LUNG: + rales, rhonchi in the b/l bases, and  mild wheezing   HEART: Regular rate and rhythm; No murmurs, rubs, or gallops  ABDOMEN: Soft, Nontender, Nondistended; Bowel sounds present  EXTREMITIES:  2+ Peripheral Pulses, No clubbing, cyanosis, or edema  LYMPH: No lymphadenopathy noted  SKIN: No rashes or lesions;  Good capillary refill

## 2023-09-16 NOTE — H&P ADULT - NSICDXPASTMEDICALHX_GEN_ALL_CORE_FT
PAST MEDICAL HISTORY:  Asthma     DM (diabetes mellitus)     GERD (gastroesophageal reflux disease)     HTN (hypertension)

## 2023-09-16 NOTE — H&P ADULT - PROBLEM SELECTOR PLAN 1
p/w fever, chills, coughing up brownish sputum, wheezing, sob, lack of taste, myalgia, weakness x5 days  CXR was clear  CT Chest shows New nodular patchy opacities in both lower lobes and right middle lobe,   consistent with pneumonia.  Start azithromycin 500mg qd + rocephin 1g qd  Send strep ag, legionella, RVP, procalcitonin, mycoplasma igm  tylenol prn p/w fever, chills, coughing up brownish sputum, wheezing, sob, lack of taste, myalgia, weakness x5 days  CXR was clear  CT Chest shows New nodular patchy opacities in both lower lobes and right middle lobe,   consistent with pneumonia.  Start azithromycin 500mg qd + rocephin 1g qd  Send strep ag, legionella, RVP, procalcitonin, mycoplasma igm p/w fever, chills, coughing up brownish sputum, wheezing, sob, lack of taste, myalgia, weakness x5 days  CXR was clear  CT Chest shows New nodular patchy opacities in both lower lobes and right middle lobe,   consistent with pneumonia.  Start azithromycin 500mg qd + rocephin 1g qd  Send strep ag, legionella, RVP, procalcitonin, mycoplasma igm  f/u Bcx

## 2023-09-16 NOTE — H&P ADULT - ASSESSMENT
57 y.o. male with h/o asthma, NIDDM, p/w fever, chills, coughing up brownish sputum, wheezing, sob, lack of taste, myalgia, weakness x5 days, Pt is admitted for PNA. 57 y.o. male with h/o asthma, NIDDM, HTN, GERD, p/w fever, chills, coughing up brownish sputum, wheezing, sob, lack of taste, myalgia, weakness x5 days, Pt is admitted for PNA.

## 2023-09-16 NOTE — H&P ADULT - PROBLEM SELECTOR PLAN 6
hx of candida infection in the throat   pt was treated with 10 days of fluconazole  no abnormalities on physical exam  monitor for any signs of candida infection  f/u fungitell - hx of asthma trakes singulair and advair at home  - mild B/L wheezing on exam  - c/w home meds  CT Chest shows New nodular patchy opacities in both lower lobes and right middle lobe,

## 2023-09-16 NOTE — ED ADULT NURSE NOTE - PATIENT'S PREFERRED PRONOUN
"Chief Complaint   Patient presents with     Prenatal Care       Initial /70   Pulse 73   Temp 98.7  F (37.1  C) (Tympanic)   Resp 18   Wt 85.3 kg (188 lb)   LMP 08/30/2021 (Approximate)   SpO2 99%   BMI 28.59 kg/m   Estimated body mass index is 28.59 kg/m  as calculated from the following:    Height as of 5/17/22: 1.727 m (5' 8\").    Weight as of this encounter: 85.3 kg (188 lb).  Medication Reconciliation: complete  Anali Kincaid LPN  "
Him/He

## 2023-09-16 NOTE — H&P ADULT - PROBLEM SELECTOR PLAN 5
- hx of asthma trakes singulair and advair at home  - mild B/L wheezing on exam  - c/w home meds  CT Chest shows New nodular patchy opacities in both lower lobes and right middle lobe, hx of GERD takes omeprazole   c/w PPI

## 2023-09-16 NOTE — H&P ADULT - NSHPREVIEWOFSYSTEMS_GEN_ALL_CORE
CONSTITUTIONAL: +  fever, chills, myalgia  EYES: No eye pain, visual disturbances, or discharge  ENT:  No difficulty hearing, tinnitus, vertigo; No sinus or throat pain  NECK: No pain or stiffness  RESPIRATORY: +  cough, wheezing, chills No hemoptysis; + Shortness of Breath  CARDIOVASCULAR: No chest pain, palpitations, passing out, dizziness, or leg swelling  GASTROINTESTINAL: No abdominal or epigastric pain. No nausea, vomiting, or hematemesis; No diarrhea or constipation. No melena or hematochezia.  GENITOURINARY: No dysuria, frequency, hematuria, or incontinence  NEUROLOGICAL: No headaches, memory loss, loss of strength, numbness, or tremors  SKIN: No itching, burning, rashes, or lesions   LYMPH Nodes: No enlarged glands  ENDOCRINE: No heat or cold intolerance; No hair loss  MUSCULOSKELETAL: No joint pain or swelling; No muscle, back, No extremity pain  PSYCHIATRIC: No depression, anxiety, mood swings, or difficulty sleeping  HEME/LYMPH: No easy bruising, or bleeding gums  ALLERGY AND IMMUNOLOGIC: No hives or eczema

## 2023-09-16 NOTE — H&P ADULT - PROBLEM SELECTOR PLAN 7
dvt ppx: lovenox  GI ppx: ppi hx of candida infection in the throat   pt was treated with 10 days of fluconazole  no abnormalities on physical exam  monitor for any signs of candida infection  f/u fungitell

## 2023-09-16 NOTE — H&P ADULT - ATTENDING COMMENTS
Patient is a 57 year old male with hx of asthma, non-insulin dependent DM, HTN, GERD, presenting to the ED with fevers, shortness of breath and cough for past 5 days. Patient reports productive cough with brown sputum and daily subjective fevers at home with associated sweats and rigors. Patient also reports that he was recently seen at [facility] where he was to have ?esophageal candidiasis and was treated with a 10 day course of oral fluconazole. He otherwise denies chest pain, palpitations, abdominal pain, nausea, vomiting, diarrhea, dysuria, recent travel or known sick contacts. In the ED, patient afebrile with stable vitals. Labs significant for WBC 16.30 and Cr of 1.32. CXR negative. CT chest with new nodular patchy opacities in both lower lobes and right middle lobe, CT done last month clear. S/p Ceftriaxone and Azithromycin.     A/P  #Pneumonia   #MARLI  #DM  #HTN  #GERD  #Asthma  #ppx measures   - CT consistent with pneumonia. Continue with Ceftriaxone and Azithromycin  - Given history of recent treatment of esophageal candidiasis with fluconazole, will also test for fungitell. However, low clinical suspicion of fungal pneumonia.   - Follow up blood/sputum cultures. pneumonia workup  - Elevated creatinine likely 2/2 sepsis. Follow up urine lytes.  - IV hydration   - Remainder of management as described above in resident's note.

## 2023-09-16 NOTE — ED PROVIDER NOTE - CLINICAL SUMMARY MEDICAL DECISION MAKING FREE TEXT BOX
fever, coughing, sob for 5 days, pt with bibasilar rales, concern for COVID, PNA, asthma exac., will get labs., CXR, give treatment, reassess

## 2023-09-16 NOTE — ED ADULT NURSE NOTE - NSFALLUNIVINTERV_ED_ALL_ED
Bed/Stretcher in lowest position, wheels locked, appropriate side rails in place/Call bell, personal items and telephone in reach/Instruct patient to call for assistance before getting out of bed/chair/stretcher/Non-slip footwear applied when patient is off stretcher/[place] to call system/Physically safe environment - no spills, clutter or unnecessary equipment/Purposeful proactive rounding/Room/bathroom lighting operational, light cord in reach

## 2023-09-16 NOTE — ED PROVIDER NOTE - PROGRESS NOTE DETAILS
Labs/CXR exolained to pt  Pt with leukocytosis, PNA, asthma exac., will admit pt.  Case d/w Dr. Jackson

## 2023-09-17 ENCOUNTER — TRANSCRIPTION ENCOUNTER (OUTPATIENT)
Age: 58
End: 2023-09-17

## 2023-09-17 DIAGNOSIS — B37.0 CANDIDAL STOMATITIS: ICD-10-CM

## 2023-09-17 DIAGNOSIS — K21.9 GASTRO-ESOPHAGEAL REFLUX DISEASE WITHOUT ESOPHAGITIS: ICD-10-CM

## 2023-09-17 DIAGNOSIS — N17.9 ACUTE KIDNEY FAILURE, UNSPECIFIED: ICD-10-CM

## 2023-09-17 DIAGNOSIS — I10 ESSENTIAL (PRIMARY) HYPERTENSION: ICD-10-CM

## 2023-09-17 LAB
A1C WITH ESTIMATED AVERAGE GLUCOSE RESULT: 9.2 % — HIGH (ref 4–5.6)
ALBUMIN SERPL ELPH-MCNC: 3.1 G/DL — LOW (ref 3.5–5)
ALP SERPL-CCNC: 79 U/L — SIGNIFICANT CHANGE UP (ref 40–120)
ALT FLD-CCNC: 119 U/L DA — HIGH (ref 10–60)
ANION GAP SERPL CALC-SCNC: 10 MMOL/L — SIGNIFICANT CHANGE UP (ref 5–17)
AST SERPL-CCNC: 29 U/L — SIGNIFICANT CHANGE UP (ref 10–40)
BASOPHILS # BLD AUTO: 0.01 K/UL — SIGNIFICANT CHANGE UP (ref 0–0.2)
BASOPHILS NFR BLD AUTO: 0.1 % — SIGNIFICANT CHANGE UP (ref 0–2)
BILIRUB SERPL-MCNC: 0.9 MG/DL — SIGNIFICANT CHANGE UP (ref 0.2–1.2)
BUN SERPL-MCNC: 13 MG/DL — SIGNIFICANT CHANGE UP (ref 7–18)
CALCIUM SERPL-MCNC: 9.3 MG/DL — SIGNIFICANT CHANGE UP (ref 8.4–10.5)
CHLORIDE SERPL-SCNC: 107 MMOL/L — SIGNIFICANT CHANGE UP (ref 96–108)
CHOLEST SERPL-MCNC: 172 MG/DL — SIGNIFICANT CHANGE UP
CO2 SERPL-SCNC: 24 MMOL/L — SIGNIFICANT CHANGE UP (ref 22–31)
CREAT SERPL-MCNC: 1.14 MG/DL — SIGNIFICANT CHANGE UP (ref 0.5–1.3)
EGFR: 75 ML/MIN/1.73M2 — SIGNIFICANT CHANGE UP
EOSINOPHIL # BLD AUTO: 0 K/UL — SIGNIFICANT CHANGE UP (ref 0–0.5)
EOSINOPHIL NFR BLD AUTO: 0 % — SIGNIFICANT CHANGE UP (ref 0–6)
ESTIMATED AVERAGE GLUCOSE: 217 MG/DL — HIGH (ref 68–114)
GLUCOSE BLDC GLUCOMTR-MCNC: 264 MG/DL — HIGH (ref 70–99)
GLUCOSE BLDC GLUCOMTR-MCNC: 299 MG/DL — HIGH (ref 70–99)
GLUCOSE BLDC GLUCOMTR-MCNC: 318 MG/DL — HIGH (ref 70–99)
GLUCOSE BLDC GLUCOMTR-MCNC: 360 MG/DL — HIGH (ref 70–99)
GLUCOSE SERPL-MCNC: 360 MG/DL — HIGH (ref 70–99)
HCT VFR BLD CALC: 43.6 % — SIGNIFICANT CHANGE UP (ref 39–50)
HDLC SERPL-MCNC: 49 MG/DL — SIGNIFICANT CHANGE UP
HGB BLD-MCNC: 15 G/DL — SIGNIFICANT CHANGE UP (ref 13–17)
IMM GRANULOCYTES NFR BLD AUTO: 0.6 % — SIGNIFICANT CHANGE UP (ref 0–0.9)
LIPID PNL WITH DIRECT LDL SERPL: 109 MG/DL — HIGH
LYMPHOCYTES # BLD AUTO: 0.75 K/UL — LOW (ref 1–3.3)
LYMPHOCYTES # BLD AUTO: 6.4 % — LOW (ref 13–44)
MAGNESIUM SERPL-MCNC: 2.2 MG/DL — SIGNIFICANT CHANGE UP (ref 1.6–2.6)
MCHC RBC-ENTMCNC: 29.4 PG — SIGNIFICANT CHANGE UP (ref 27–34)
MCHC RBC-ENTMCNC: 34.4 GM/DL — SIGNIFICANT CHANGE UP (ref 32–36)
MCV RBC AUTO: 85.3 FL — SIGNIFICANT CHANGE UP (ref 80–100)
MONOCYTES # BLD AUTO: 0.07 K/UL — SIGNIFICANT CHANGE UP (ref 0–0.9)
MONOCYTES NFR BLD AUTO: 0.6 % — LOW (ref 2–14)
NEUTROPHILS # BLD AUTO: 10.74 K/UL — HIGH (ref 1.8–7.4)
NEUTROPHILS NFR BLD AUTO: 92.3 % — HIGH (ref 43–77)
NON HDL CHOLESTEROL: 123 MG/DL — SIGNIFICANT CHANGE UP
NRBC # BLD: 0 /100 WBCS — SIGNIFICANT CHANGE UP (ref 0–0)
PHOSPHATE SERPL-MCNC: 2.9 MG/DL — SIGNIFICANT CHANGE UP (ref 2.5–4.5)
PLATELET # BLD AUTO: 142 K/UL — LOW (ref 150–400)
POTASSIUM SERPL-MCNC: 4.8 MMOL/L — SIGNIFICANT CHANGE UP (ref 3.5–5.3)
POTASSIUM SERPL-SCNC: 4.8 MMOL/L — SIGNIFICANT CHANGE UP (ref 3.5–5.3)
PROCALCITONIN SERPL-MCNC: 0.16 NG/ML — HIGH (ref 0.02–0.1)
PROT SERPL-MCNC: 7.4 G/DL — SIGNIFICANT CHANGE UP (ref 6–8.3)
RBC # BLD: 5.11 M/UL — SIGNIFICANT CHANGE UP (ref 4.2–5.8)
RBC # FLD: 11.9 % — SIGNIFICANT CHANGE UP (ref 10.3–14.5)
SODIUM SERPL-SCNC: 141 MMOL/L — SIGNIFICANT CHANGE UP (ref 135–145)
TRIGL SERPL-MCNC: 69 MG/DL — SIGNIFICANT CHANGE UP
WBC # BLD: 11.64 K/UL — HIGH (ref 3.8–10.5)
WBC # FLD AUTO: 11.64 K/UL — HIGH (ref 3.8–10.5)

## 2023-09-17 PROCEDURE — 99233 SBSQ HOSP IP/OBS HIGH 50: CPT

## 2023-09-17 RX ORDER — GABAPENTIN 400 MG/1
300 CAPSULE ORAL DAILY
Refills: 0 | Status: DISCONTINUED | OUTPATIENT
Start: 2023-09-17 | End: 2023-09-18

## 2023-09-17 RX ORDER — CEFTRIAXONE 500 MG/1
1000 INJECTION, POWDER, FOR SOLUTION INTRAMUSCULAR; INTRAVENOUS EVERY 24 HOURS
Refills: 0 | Status: DISCONTINUED | OUTPATIENT
Start: 2023-09-17 | End: 2023-09-17

## 2023-09-17 RX ORDER — MONTELUKAST 4 MG/1
1 TABLET, CHEWABLE ORAL
Refills: 0 | DISCHARGE

## 2023-09-17 RX ORDER — SODIUM CHLORIDE 9 MG/ML
1000 INJECTION, SOLUTION INTRAVENOUS
Refills: 0 | Status: DISCONTINUED | OUTPATIENT
Start: 2023-09-17 | End: 2023-09-18

## 2023-09-17 RX ORDER — INSULIN LISPRO 100/ML
VIAL (ML) SUBCUTANEOUS
Refills: 0 | Status: DISCONTINUED | OUTPATIENT
Start: 2023-09-17 | End: 2023-09-18

## 2023-09-17 RX ORDER — AZITHROMYCIN 500 MG/1
500 TABLET, FILM COATED ORAL EVERY 24 HOURS
Refills: 0 | Status: DISCONTINUED | OUTPATIENT
Start: 2023-09-18 | End: 2023-09-18

## 2023-09-17 RX ORDER — OMEPRAZOLE 10 MG/1
1 CAPSULE, DELAYED RELEASE ORAL
Refills: 0 | DISCHARGE

## 2023-09-17 RX ORDER — FLUTICASONE PROPIONATE AND SALMETEROL 50; 250 UG/1; UG/1
1 POWDER ORAL; RESPIRATORY (INHALATION)
Refills: 0 | DISCHARGE

## 2023-09-17 RX ORDER — INSULIN GLARGINE 100 [IU]/ML
8 INJECTION, SOLUTION SUBCUTANEOUS AT BEDTIME
Refills: 0 | Status: DISCONTINUED | OUTPATIENT
Start: 2023-09-17 | End: 2023-09-18

## 2023-09-17 RX ORDER — BUDESONIDE AND FORMOTEROL FUMARATE DIHYDRATE 160; 4.5 UG/1; UG/1
2 AEROSOL RESPIRATORY (INHALATION)
Refills: 0 | Status: DISCONTINUED | OUTPATIENT
Start: 2023-09-17 | End: 2023-09-18

## 2023-09-17 RX ORDER — AZITHROMYCIN 500 MG/1
500 TABLET, FILM COATED ORAL ONCE
Refills: 0 | Status: COMPLETED | OUTPATIENT
Start: 2023-09-17 | End: 2023-09-17

## 2023-09-17 RX ORDER — ENOXAPARIN SODIUM 100 MG/ML
40 INJECTION SUBCUTANEOUS EVERY 24 HOURS
Refills: 0 | Status: DISCONTINUED | OUTPATIENT
Start: 2023-09-17 | End: 2023-09-18

## 2023-09-17 RX ORDER — PANTOPRAZOLE SODIUM 20 MG/1
40 TABLET, DELAYED RELEASE ORAL
Refills: 0 | Status: DISCONTINUED | OUTPATIENT
Start: 2023-09-17 | End: 2023-09-18

## 2023-09-17 RX ORDER — MONTELUKAST 4 MG/1
10 TABLET, CHEWABLE ORAL DAILY
Refills: 0 | Status: DISCONTINUED | OUTPATIENT
Start: 2023-09-17 | End: 2023-09-18

## 2023-09-17 RX ORDER — AZITHROMYCIN 500 MG/1
TABLET, FILM COATED ORAL
Refills: 0 | Status: DISCONTINUED | OUTPATIENT
Start: 2023-09-17 | End: 2023-09-18

## 2023-09-17 RX ORDER — GABAPENTIN 400 MG/1
1 CAPSULE ORAL
Refills: 0 | DISCHARGE

## 2023-09-17 RX ORDER — LORATADINE 10 MG/1
1 TABLET ORAL
Refills: 0 | DISCHARGE

## 2023-09-17 RX ORDER — CEFTRIAXONE 500 MG/1
1000 INJECTION, POWDER, FOR SOLUTION INTRAMUSCULAR; INTRAVENOUS EVERY 24 HOURS
Refills: 0 | Status: DISCONTINUED | OUTPATIENT
Start: 2023-09-17 | End: 2023-09-18

## 2023-09-17 RX ORDER — INSULIN LISPRO 100/ML
2 VIAL (ML) SUBCUTANEOUS
Refills: 0 | Status: DISCONTINUED | OUTPATIENT
Start: 2023-09-17 | End: 2023-09-18

## 2023-09-17 RX ORDER — INSULIN LISPRO 100/ML
VIAL (ML) SUBCUTANEOUS AT BEDTIME
Refills: 0 | Status: DISCONTINUED | OUTPATIENT
Start: 2023-09-17 | End: 2023-09-18

## 2023-09-17 RX ORDER — BISOPROLOL FUMARATE 10 MG/1
1 TABLET, FILM COATED ORAL
Refills: 0 | DISCHARGE

## 2023-09-17 RX ADMIN — MONTELUKAST 10 MILLIGRAM(S): 4 TABLET, CHEWABLE ORAL at 13:23

## 2023-09-17 RX ADMIN — Medication 4: at 17:12

## 2023-09-17 RX ADMIN — SODIUM CHLORIDE 75 MILLILITER(S): 9 INJECTION, SOLUTION INTRAVENOUS at 05:50

## 2023-09-17 RX ADMIN — BUDESONIDE AND FORMOTEROL FUMARATE DIHYDRATE 2 PUFF(S): 160; 4.5 AEROSOL RESPIRATORY (INHALATION) at 21:58

## 2023-09-17 RX ADMIN — BUDESONIDE AND FORMOTEROL FUMARATE DIHYDRATE 2 PUFF(S): 160; 4.5 AEROSOL RESPIRATORY (INHALATION) at 10:24

## 2023-09-17 RX ADMIN — Medication 5: at 11:50

## 2023-09-17 RX ADMIN — GABAPENTIN 300 MILLIGRAM(S): 400 CAPSULE ORAL at 13:23

## 2023-09-17 RX ADMIN — SODIUM CHLORIDE 75 MILLILITER(S): 9 INJECTION, SOLUTION INTRAVENOUS at 17:13

## 2023-09-17 RX ADMIN — INSULIN GLARGINE 8 UNIT(S): 100 INJECTION, SOLUTION SUBCUTANEOUS at 21:56

## 2023-09-17 RX ADMIN — Medication 1: at 21:57

## 2023-09-17 RX ADMIN — PANTOPRAZOLE SODIUM 40 MILLIGRAM(S): 20 TABLET, DELAYED RELEASE ORAL at 06:28

## 2023-09-17 RX ADMIN — CEFTRIAXONE 100 MILLIGRAM(S): 500 INJECTION, POWDER, FOR SOLUTION INTRAMUSCULAR; INTRAVENOUS at 13:25

## 2023-09-17 RX ADMIN — Medication 3: at 09:26

## 2023-09-17 NOTE — PROGRESS NOTE ADULT - SUBJECTIVE AND OBJECTIVE BOX
Interval of present illness: No acute events overnight. Pt seen at bedside. Cough+ Chills+    REVIEW OF SYSTEMS:    CONSTITUTIONAL: Chills+  EYES: No acute visual disturbances  NECK: No pain or stiffness  RESPIRATORY: Cough+  CARDIOVASCULAR: No chest pain, no palpitations  GASTROINTESTINAL: No pain. No nausea or vomiting.  No diarrhea   NEUROLOGICAL: No headache or numbness, no tremors  MUSCULOSKELETAL: no muscle pain  GENITOURINARY: No dysuria, no frequency, no hesitancy  PSYCHIATRY: No depression , no anxiety  ALL OTHER  ROS negative     O:  Vital Signs Last 24 Hrs  T(C): 36.5 (17 Sep 2023 13:04), Max: 37.3 (16 Sep 2023 17:00)  T(F): 97.7 (17 Sep 2023 13:04), Max: 99.2 (16 Sep 2023 17:00)  HR: 105 (17 Sep 2023 13:04) (82 - 105)  BP: 109/88 (17 Sep 2023 13:04) (109/88 - 134/83)  BP(mean): 82 (16 Sep 2023 23:14) (82 - 82)  RR: 18 (17 Sep 2023 13:04) (16 - 20)  SpO2: 94% (17 Sep 2023 13:04) (93% - 96%)    Parameters below as of 17 Sep 2023 13:04  Patient On (Oxygen Delivery Method): room air        Gen: NAD  Neuro: alert, answering qs appropriately, moves all extremities  HEENT: anicteric, moist oral mucosa  Neck: supple, no JVD elevation  Cards: RRR  Pulm: good inspiratory effort, good air movement into all lung fields, trace crackles  Abd: soft, NT/ND, BS+  Ext: no edema  Skin: warm, dry      albuterol/ipratropium for Nebulization 3 milliLiter(s) Nebulizer every 20 minutes  azithromycin  IVPB      budesonide 160 MICROgram(s)/formoterol 4.5 MICROgram(s) Inhaler 2 Puff(s) Inhalation two times a day  cefTRIAXone   IVPB 1000 milliGRAM(s) IV Intermittent every 24 hours  enoxaparin Injectable 40 milliGRAM(s) SubCutaneous every 24 hours  gabapentin 300 milliGRAM(s) Oral daily  insulin lispro (ADMELOG) corrective regimen sliding scale   SubCutaneous at bedtime  insulin lispro (ADMELOG) corrective regimen sliding scale   SubCutaneous three times a day before meals  lactated ringers. 1000 milliLiter(s) IV Continuous <Continuous>  montelukast 10 milliGRAM(s) Oral daily  pantoprazole    Tablet 40 milliGRAM(s) Oral before breakfast                            15.0   11.64 )-----------( 142      ( 17 Sep 2023 05:40 )             43.6       09-17    141  |  107  |  13  ----------------------------<  360<H>  4.8   |  24  |  1.14    Ca    9.3      17 Sep 2023 05:40  Phos  2.9     09-17  Mg     2.2     09-17    TPro  7.4  /  Alb  3.1<L>  /  TBili  0.9  /  DBili  x   /  AST  29  /  ALT  119<H>  /  AlkPhos  79  09-17

## 2023-09-17 NOTE — PATIENT PROFILE ADULT - LANGUAGE ASSISTANCE NEEDED
Pt able to speak English as well/No-Patient/Caregiver offered and refused free interpretation services.

## 2023-09-17 NOTE — PATIENT PROFILE ADULT - FALL HARM RISK - UNIVERSAL INTERVENTIONS
Bed in lowest position, wheels locked, appropriate side rails in place/Call bell, personal items and telephone in reach/Instruct patient to call for assistance before getting out of bed or chair/Non-slip footwear when patient is out of bed/[place] to call system/Physically safe environment - no spills, clutter or unnecessary equipment/Purposeful Proactive Rounding/Room/bathroom lighting operational, light cord in reach

## 2023-09-17 NOTE — PATIENT PROFILE ADULT - NSPROPASSIVESMOKEEXPOSURE_GEN_A_NUR
[address] Encounter Date/Time: [address] Account: [de-identified]    MRN: [id number]    Patient: Marian Rollins    Contact Serial #: [id number]      ENCOUNTER          Patient Class: I Private Enc? No Unit RM BD: [address] 15    Hospital Service: MED   Encounter DX: Pneumonia of left lung d*   ADM Provider: Rabia Avila MD   Procedure:     ATT Provider: Rabia Avila MD   REF Provider:        Admission DX: Pneumonia of left lung due to infectious organism, unspecified part of lung, CAP (community acquired pneumonia) due to Chlamydia species and DX codes: J18.9, J16.0      PATIENT                 Name: Marian Rollins : 1977 (45 yrs)   Address: [place] [address] Sex: Female   [facility] [address]         Marital Status:    Employer: DISABLED         Orthodoxy: [address]   Primary Care Provider: Lupe Rincon DO         Primary Phone: [phone] [address] Name Legal Guardian? Relationship to Patient Home Phone Work Phone   1. Sandhya Borges  2. Shon Templeton No    Parent  Other [phone]              GUARANTOR            Guarantor: Marian Rollins     : 1977   Address: [address] Sex: [place] [address]     Relation to Patient: Self       Home Phone: [phone]   Guarantor ID: [id number]       Work Phone:     Guarantor Employer: DISABLED         Status: DISABLED      COVERAGE        PRIMARY INSURANCE   Payor: [employer] C* Plan: Mobile COMMUN*   Payor Address: Kiarra Granger[address] [facility]       Group Number: [facility] Insurance Type: INDEMNITY   Subscriber Name: [place] : 1977   Subscriber ID: [id number] Pat. Rel. to Sub: Self   SECONDARY INSURANCE   Payor:   Plan:     Payor Address:  ,           Group Number:   Insurance Type:     Subscriber Name:   Subscriber :     Subscriber ID:   Pat.  Rel. to Sub:           CSN: [id number]        [facility]                                    Req/Control # [Problem retrieving Specimen ID]                                   Order Date:  Av 15, 2023  [id number]                                          Patient Information      Name:  Luly Jackson  :  1977  Age:  41 y.o. Address:  [place], [place]   Zip:  [address]  PCP: Venecia Daigle DO Sex:  F  SSN: [id number]  Home Phone: [phone]  Work Phone:    Patient MRN:  [id number]    Alt Patient ID:  [id number]  PCP Phone: [phone]       Authorizing Provider Information       AUTHORIZING PROVIDER: Vitaly Romero MD  Physician ID: [id number]  NPI:  [id number]  Site:   Address: [place] [address]  [place] [address]: [address]  Phone: [phone]  Fax:              [address] [facility]  DME Order for Nebulizer as OP [[id number]] (ORD   #:   [id number]) Priority  Routine Class  Hospital Performed        Associated Diagnosis:  Chronic obstructive pulmonary disease, unspecified COPD type ([facility] (J44.9 [ICD-10-CM])        Comments: You must complete the order parameters below and add the medical necessity documentation for this DME in a separate note. Nebulizer with compressor  Disposable Med Nebs 2 per month  Reusable Med Nebs 1 per 6 months  Aerosol Mask 1 per month  Replacement Filters 2 per month  All other related supplies as needed per month     Frequency: Three times daily     Diagnosis: COPD     Length of Need: 12 months            Scheduling Instructions:                            Medications being used:  Other (Comment) (duoneb)     Specimen Source             Collection Date    Collection Time    Order Status    Expected Date                 Electronically Signed By  Vitaly Romero MD  NPI:  [id number] Date  Av 15, 2023  4:09 PM              Responsible Party [address] [place] [address]   Relationship Account Type Home Phone   [facility] / [address] Self P/F [phone]   Employer   Work [employer] [address]     Primary Insurance  Insurance/Subscriber ID:  [id number]  Subscriber Name:  Vesna Tucker              Relationship to Patient: SelfSigned ABN: N    Payor Name:  [facility]   Plan:  [employer] [facility]   Group: [id number]  Worker's Comp Date of Injury:             Tena More MD   Physician   Pulmonology   Progress Notes       Signed   Date of Service:  1/15/2023  4:10 PM                 Signed        Expand All Collapse All                                                                                                                                                                                                                                                                                                                                                                                                          Pulmonary Progress Note  [facility]        Patient - Yue Cordero,  Age - 41 y.o.    - 1977      Room Number -    N -  [id number]   Acct # - [de-identified]  Date of Admission -  2023  5:57 PM           Consulting Rylie Rodriguez MD  Primary Care Physician - July Licona,       SUBJECTIVE   Patient is in good spirits. Resting quietly. She claims she feels much better. O2 saturations 99 to 97% on room air. She wants to go home. She feels like she can take care of things at home     OBJECTIVE   VITALS    oral temperature is 98.8 °F (37.1 °C). Her blood pressure is 136/71 and her pulse is 74. Her respiration is 16 and oxygen saturation is 99%. There is no height or weight on file to calculate BMI.   Temperature Range: Temp: 98.8 °F (37.1 °C) Temp  Av.6 °F (37 °C)  Min: 98.4 °F (36.9 °C)  Max: 98.8 °F (37.1 °C)  BP Range:  Systolic (87OXY), JZV:433 , Min:136 , LHI:524     Diastolic (13YOK), DESTINY:59, Min:71, Max:76     Pulse Range: Pulse  Av.7  Min: 55  Max: 74  Respiration Range: Resp  Av.3  Min: 16  Max: 17  Current Pulse Ox[de-identified]  SpO2: 99 %  24HR Pulse Ox Range:  SpO2  Av.8 %  Min: 91 %  Max: 99 %  Oxygen Amount and Delivery: Wt Readings from Last 3 Encounters:   23 166 lb 0.1 oz (75.3 kg)   22 166 lb 1.6 oz (75.3 kg)   22 163 lb (73.9 kg)         I/O (24 Hours)     Intake/Output Summary (Last 24 hours) at 1/15/2023 1610  Last data filed at 1/15/2023 1435      Gross per 24 hour   Intake 1260 ml   Output --   Net 1260 ml         EXAM      General Appearance  Awake, alert, oriented, in no acute distress  HEENT - normocephalic, atraumatic. Neck - Supple,  trachea midline   Lungs -coarse breath sounds no crackles rales  Heart Exam:PMI normal. No lifts, heaves, or thrills. RRR. No murmurs, clicks, gallops, or rubs  Abdomen Exam: Abdomen soft, non-tender.    Extremity Exam: No signs of cyanosis     MEDS      Scheduled Medications    cefTRIAXone (ROCEPHIN) IV  1,000 mg IntraVENous Q24H    budesonide-formoterol  2 puff Inhalation BID    dexlansoprazole  60 mg Oral Nightly    metoclopramide  5 mg Oral 4x Daily AC & HS    azithromycin  500 mg Oral Daily    risperiDONE  0.5 mg Oral BID    mirtazapine  7.5 mg Oral Nightly    sodium chloride flush  5-40 mL IntraVENous 2 times per day    enoxaparin  40 mg SubCUTAneous Daily    ipratropium-albuterol  1 ampule Inhalation Q4H WA         Infusions Meds    sodium chloride           PRN Medications   dicyclomine, Benzocaine-Menthol, albuterol sulfate HFA, cyclobenzaprine, sodium chloride flush, sodium chloride, ondansetron **OR** ondansetron, magnesium hydroxide, acetaminophen **OR** acetaminophen, albuterol        LABS   CBC       Recent Labs     01/15/23  0557   WBC 5.7   HGB 11.2*   HCT 33.7*   MCV 88.6         BMP:         Lab Results   Component Value Date/Time      01/15/2023 05:57 AM     K 3.8 01/15/2023 05:57 AM      01/15/2023 05:57 AM     CO2 23 01/15/2023 05:57 AM     BUN 9 01/15/2023 05:57 AM     LABALBU 3.4 01/13/2023 06:30 PM     LABALBU 4.1 04/04/2012 05:34 PM     CREATININE 0.68 01/15/2023 05:57 AM     CALCIUM 8.5 01/15/2023 05:57 AM     GFRAA >60 07/05/2022 11:33 PM     LABGLOM >60 01/15/2023 05:57 AM      ABGs:No results found for: PHART, PO2ART, XTX6FCE No results found for: IFIO2, MODE, SETTIDVOL, SETPEEP  Ionized Calcium:  No results found for: IONCA  Magnesium:          Lab Results   Component Value Date/Time     MG 1.8 01/10/2023 07:49 AM      Phosphorus:          Lab Results   Component Value Date/Time     PHOS 4.0 08/01/2013 10:13 AM         LIVER PROFILE       Recent Labs     01/13/23  1830   AST 33*   ALT 55*   LIPASE 159*   BILITOT 0.3   ALKPHOS 76      INR No results for input(s): INR in the last 72 hours.   PTT         Lab Results   Component Value Date     APTT 28.5 09/17/2013            RADIOLOGY      (See actual reports for details)     ASSESSMENT/PLAN          Patient Active Problem List   Diagnosis    Hypercholesteremia    Panic anxiety syndrome    DM (diabetes mellitus) (HCC)    IBS (irritable bowel syndrome)    Mood swings    Hypothyroidism    Constipation    Endometriosis    Agoraphobia    HETAL RS with enterocele and Ovarian Preservation 9/23/13    Left ankle pain    Diarrhea    Vomiting    Rectal bleeding    Midline low back pain without sciatica    Irritable bowel syndrome without diarrhea    Gastroesophageal reflux disease     Abdominal pain, generalized    Carpal tunnel syndrome on right    Major depression with psychotic features ([address]    Bipolar disorder with psychotic features ([address]    Cocaine use    Hx of diabetes mellitus    Opiate overdose ([address]    Acute respiratory failure with hypoxia (HCC)    Elevated troponin    Rhabdomyolysis    FUNMILAYO (acute kidney injury) ([address]    Hyperkalemia    Esophageal dilatation    Metabolic acidosis Polysubstance abuse ([facility]    Arterial hypotension    Disorientation    CAP (community acquired pneumonia)    Pneumonia of left lung due to infectious organism    Interstitial pneumonia of both lungs (HCC)      Impression  #1 multilobar process. Likely infectious versus noninfectious inflammatory process. Possibly aspiration  #2 recent hospitalization for acute mental status. Talk screen positive. Patient was treated on Zosyn for pneumonia. #3.  Hospitalization for rhabdomyolysis, recent  #4 hospitalization for FUNMILAYO, recently, renal function now normal  #5 Tox screen positive for THC and fentanyl  #6. Tobacco history  #7. Suicidal ideation, history of depression anxiety sees a physician at [facility]  #8 history of GERD  #9 history of irritable bowel syndrome  #10 history of hypothyroidism  #11 history of type 2 diabetes  #12, clinical diagnosis of COPD. Patient has a significant tobacco history. History of wheezing  #13, right lower lobe pulmonary nodule. Clear if this is inflammatory infectious or neoplastic at this time     #13  full code     Internal medicine service will be discharging patient on Omnicef and Zithromax. This is after 1 dose of Rocephin. Seems reasonable. I suspect that she has moderate COPD. She voiced understanding that smoking cessation will improve her quality of life and likely extended. Face-to-face discussion was done for her to use her nebulizer machine for DuoNeb treatments for COPD. She voiced understanding and is more than okay to take the medication as she feels it helps her greatly. She was understand to call or go back to the ED if she feels worse. We would like to see her again in 2 to 3 weeks with lung function test.     We will likely need to repeat a CT scan in 4 to 6 weeks without IV contrast     Patient happy to be going home.      Conferred with bedside nurse as well as don Peñaloza RN     Office address  [address] [place] [facility]  [phone]        Electronically signed by Umberto Marcus MD on 1/15/2023 at 4:10 PM No

## 2023-09-18 ENCOUNTER — TRANSCRIPTION ENCOUNTER (OUTPATIENT)
Age: 58
End: 2023-09-18

## 2023-09-18 VITALS
RESPIRATION RATE: 17 BRPM | DIASTOLIC BLOOD PRESSURE: 83 MMHG | TEMPERATURE: 98 F | SYSTOLIC BLOOD PRESSURE: 112 MMHG | HEART RATE: 77 BPM | OXYGEN SATURATION: 95 %

## 2023-09-18 LAB
ALBUMIN SERPL ELPH-MCNC: 2.9 G/DL — LOW (ref 3.5–5)
ALP SERPL-CCNC: 72 U/L — SIGNIFICANT CHANGE UP (ref 40–120)
ALT FLD-CCNC: 82 U/L DA — HIGH (ref 10–60)
ANION GAP SERPL CALC-SCNC: 8 MMOL/L — SIGNIFICANT CHANGE UP (ref 5–17)
APPEARANCE UR: CLEAR — SIGNIFICANT CHANGE UP
AST SERPL-CCNC: 17 U/L — SIGNIFICANT CHANGE UP (ref 10–40)
BASOPHILS # BLD AUTO: 0.01 K/UL — SIGNIFICANT CHANGE UP (ref 0–0.2)
BASOPHILS NFR BLD AUTO: 0.1 % — SIGNIFICANT CHANGE UP (ref 0–2)
BILIRUB SERPL-MCNC: 0.5 MG/DL — SIGNIFICANT CHANGE UP (ref 0.2–1.2)
BILIRUB UR-MCNC: NEGATIVE — SIGNIFICANT CHANGE UP
BUN SERPL-MCNC: 20 MG/DL — HIGH (ref 7–18)
CALCIUM SERPL-MCNC: 8.7 MG/DL — SIGNIFICANT CHANGE UP (ref 8.4–10.5)
CHLORIDE SERPL-SCNC: 108 MMOL/L — SIGNIFICANT CHANGE UP (ref 96–108)
CO2 SERPL-SCNC: 25 MMOL/L — SIGNIFICANT CHANGE UP (ref 22–31)
COLOR SPEC: YELLOW — SIGNIFICANT CHANGE UP
CREAT ?TM UR-MCNC: 93 MG/DL — SIGNIFICANT CHANGE UP
CREAT SERPL-MCNC: 1.11 MG/DL — SIGNIFICANT CHANGE UP (ref 0.5–1.3)
DIFF PNL FLD: NEGATIVE — SIGNIFICANT CHANGE UP
EGFR: 78 ML/MIN/1.73M2 — SIGNIFICANT CHANGE UP
EOSINOPHIL # BLD AUTO: 0 K/UL — SIGNIFICANT CHANGE UP (ref 0–0.5)
EOSINOPHIL NFR BLD AUTO: 0 % — SIGNIFICANT CHANGE UP (ref 0–6)
GLUCOSE BLDC GLUCOMTR-MCNC: 217 MG/DL — HIGH (ref 70–99)
GLUCOSE BLDC GLUCOMTR-MCNC: 254 MG/DL — HIGH (ref 70–99)
GLUCOSE BLDC GLUCOMTR-MCNC: 273 MG/DL — HIGH (ref 70–99)
GLUCOSE SERPL-MCNC: 334 MG/DL — HIGH (ref 70–99)
GLUCOSE UR QL: >=1000 MG/DL
HCT VFR BLD CALC: 39.9 % — SIGNIFICANT CHANGE UP (ref 39–50)
HGB BLD-MCNC: 13.6 G/DL — SIGNIFICANT CHANGE UP (ref 13–17)
IMM GRANULOCYTES NFR BLD AUTO: 0.8 % — SIGNIFICANT CHANGE UP (ref 0–0.9)
KETONES UR-MCNC: 40 MG/DL
LEGIONELLA AG UR QL: NEGATIVE — SIGNIFICANT CHANGE UP
LEUKOCYTE ESTERASE UR-ACNC: NEGATIVE — SIGNIFICANT CHANGE UP
LYMPHOCYTES # BLD AUTO: 1.58 K/UL — SIGNIFICANT CHANGE UP (ref 1–3.3)
LYMPHOCYTES # BLD AUTO: 12.4 % — LOW (ref 13–44)
MAGNESIUM SERPL-MCNC: 2.5 MG/DL — SIGNIFICANT CHANGE UP (ref 1.6–2.6)
MCHC RBC-ENTMCNC: 29 PG — SIGNIFICANT CHANGE UP (ref 27–34)
MCHC RBC-ENTMCNC: 34.1 GM/DL — SIGNIFICANT CHANGE UP (ref 32–36)
MCV RBC AUTO: 85.1 FL — SIGNIFICANT CHANGE UP (ref 80–100)
MONOCYTES # BLD AUTO: 0.67 K/UL — SIGNIFICANT CHANGE UP (ref 0–0.9)
MONOCYTES NFR BLD AUTO: 5.3 % — SIGNIFICANT CHANGE UP (ref 2–14)
MRSA PCR RESULT.: SIGNIFICANT CHANGE UP
NEUTROPHILS # BLD AUTO: 10.4 K/UL — HIGH (ref 1.8–7.4)
NEUTROPHILS NFR BLD AUTO: 81.4 % — HIGH (ref 43–77)
NITRITE UR-MCNC: NEGATIVE — SIGNIFICANT CHANGE UP
NRBC # BLD: 0 /100 WBCS — SIGNIFICANT CHANGE UP (ref 0–0)
OSMOLALITY UR: 1018 MOS/KG — SIGNIFICANT CHANGE UP (ref 50–1200)
PH UR: 6 — SIGNIFICANT CHANGE UP (ref 5–8)
PHOSPHATE SERPL-MCNC: 2.5 MG/DL — SIGNIFICANT CHANGE UP (ref 2.5–4.5)
PLATELET # BLD AUTO: 156 K/UL — SIGNIFICANT CHANGE UP (ref 150–400)
POTASSIUM SERPL-MCNC: 3.9 MMOL/L — SIGNIFICANT CHANGE UP (ref 3.5–5.3)
POTASSIUM SERPL-SCNC: 3.9 MMOL/L — SIGNIFICANT CHANGE UP (ref 3.5–5.3)
POTASSIUM UR-SCNC: 44 MMOL/L — SIGNIFICANT CHANGE UP
PROT ?TM UR-MCNC: 13 MG/DL — HIGH (ref 0–12)
PROT SERPL-MCNC: 6.7 G/DL — SIGNIFICANT CHANGE UP (ref 6–8.3)
PROT UR-MCNC: NEGATIVE MG/DL — SIGNIFICANT CHANGE UP
RBC # BLD: 4.69 M/UL — SIGNIFICANT CHANGE UP (ref 4.2–5.8)
RBC # FLD: 12.1 % — SIGNIFICANT CHANGE UP (ref 10.3–14.5)
S AUREUS DNA NOSE QL NAA+PROBE: SIGNIFICANT CHANGE UP
S PNEUM AG UR QL: NEGATIVE — SIGNIFICANT CHANGE UP
SODIUM SERPL-SCNC: 141 MMOL/L — SIGNIFICANT CHANGE UP (ref 135–145)
SODIUM UR-SCNC: 36 MMOL/L — SIGNIFICANT CHANGE UP
SP GR SPEC: 1.05 — HIGH (ref 1–1.03)
UROBILINOGEN FLD QL: 1 MG/DL — SIGNIFICANT CHANGE UP (ref 0.2–1)
UUN UR-MCNC: 884 MG/DL — SIGNIFICANT CHANGE UP
WBC # BLD: 12.76 K/UL — HIGH (ref 3.8–10.5)
WBC # FLD AUTO: 12.76 K/UL — HIGH (ref 3.8–10.5)

## 2023-09-18 PROCEDURE — 81003 URINALYSIS AUTO W/O SCOPE: CPT

## 2023-09-18 PROCEDURE — 83735 ASSAY OF MAGNESIUM: CPT

## 2023-09-18 PROCEDURE — 84100 ASSAY OF PHOSPHORUS: CPT

## 2023-09-18 PROCEDURE — 99285 EMERGENCY DEPT VISIT HI MDM: CPT | Mod: 25

## 2023-09-18 PROCEDURE — 87086 URINE CULTURE/COLONY COUNT: CPT

## 2023-09-18 PROCEDURE — 82570 ASSAY OF URINE CREATININE: CPT

## 2023-09-18 PROCEDURE — 83935 ASSAY OF URINE OSMOLALITY: CPT

## 2023-09-18 PROCEDURE — 82962 GLUCOSE BLOOD TEST: CPT

## 2023-09-18 PROCEDURE — 99239 HOSP IP/OBS DSCHRG MGMT >30: CPT

## 2023-09-18 PROCEDURE — 85025 COMPLETE CBC W/AUTO DIFF WBC: CPT

## 2023-09-18 PROCEDURE — 83605 ASSAY OF LACTIC ACID: CPT

## 2023-09-18 PROCEDURE — 84133 ASSAY OF URINE POTASSIUM: CPT

## 2023-09-18 PROCEDURE — 84540 ASSAY OF URINE/UREA-N: CPT

## 2023-09-18 PROCEDURE — 87449 NOS EACH ORGANISM AG IA: CPT

## 2023-09-18 PROCEDURE — 87040 BLOOD CULTURE FOR BACTERIA: CPT

## 2023-09-18 PROCEDURE — 99222 1ST HOSP IP/OBS MODERATE 55: CPT

## 2023-09-18 PROCEDURE — 87640 STAPH A DNA AMP PROBE: CPT

## 2023-09-18 PROCEDURE — 84300 ASSAY OF URINE SODIUM: CPT

## 2023-09-18 PROCEDURE — 94640 AIRWAY INHALATION TREATMENT: CPT

## 2023-09-18 PROCEDURE — 80053 COMPREHEN METABOLIC PANEL: CPT

## 2023-09-18 PROCEDURE — 83036 HEMOGLOBIN GLYCOSYLATED A1C: CPT

## 2023-09-18 PROCEDURE — 36415 COLL VENOUS BLD VENIPUNCTURE: CPT

## 2023-09-18 PROCEDURE — 85610 PROTHROMBIN TIME: CPT

## 2023-09-18 PROCEDURE — 71045 X-RAY EXAM CHEST 1 VIEW: CPT

## 2023-09-18 PROCEDURE — 93005 ELECTROCARDIOGRAM TRACING: CPT

## 2023-09-18 PROCEDURE — 84484 ASSAY OF TROPONIN QUANT: CPT

## 2023-09-18 PROCEDURE — 86738 MYCOPLASMA ANTIBODY: CPT

## 2023-09-18 PROCEDURE — 96375 TX/PRO/DX INJ NEW DRUG ADDON: CPT

## 2023-09-18 PROCEDURE — 84145 PROCALCITONIN (PCT): CPT

## 2023-09-18 PROCEDURE — 85730 THROMBOPLASTIN TIME PARTIAL: CPT

## 2023-09-18 PROCEDURE — 84156 ASSAY OF PROTEIN URINE: CPT

## 2023-09-18 PROCEDURE — 87641 MR-STAPH DNA AMP PROBE: CPT

## 2023-09-18 PROCEDURE — 71250 CT THORAX DX C-: CPT | Mod: MA

## 2023-09-18 PROCEDURE — 0225U NFCT DS DNA&RNA 21 SARSCOV2: CPT

## 2023-09-18 PROCEDURE — 80061 LIPID PANEL: CPT

## 2023-09-18 PROCEDURE — 96374 THER/PROPH/DIAG INJ IV PUSH: CPT

## 2023-09-18 PROCEDURE — 87899 AGENT NOS ASSAY W/OPTIC: CPT

## 2023-09-18 PROCEDURE — 86703 HIV-1/HIV-2 1 RESULT ANTBDY: CPT

## 2023-09-18 RX ORDER — LANOLIN ALCOHOL/MO/W.PET/CERES
3 CREAM (GRAM) TOPICAL AT BEDTIME
Refills: 0 | Status: DISCONTINUED | OUTPATIENT
Start: 2023-09-18 | End: 2023-09-18

## 2023-09-18 RX ORDER — CEFPODOXIME PROXETIL 100 MG
1 TABLET ORAL
Qty: 10 | Refills: 0
Start: 2023-09-18 | End: 2023-09-22

## 2023-09-18 RX ORDER — AZITHROMYCIN 500 MG/1
1 TABLET, FILM COATED ORAL
Qty: 1 | Refills: 0
Start: 2023-09-18 | End: 2023-09-18

## 2023-09-18 RX ORDER — MONTELUKAST 4 MG/1
10 TABLET, CHEWABLE ORAL AT BEDTIME
Refills: 0 | Status: DISCONTINUED | OUTPATIENT
Start: 2023-09-18 | End: 2023-09-18

## 2023-09-18 RX ORDER — CEFPODOXIME PROXETIL 100 MG
1 TABLET ORAL
Qty: 6 | Refills: 0
Start: 2023-09-18 | End: 2023-09-20

## 2023-09-18 RX ADMIN — Medication 2: at 12:41

## 2023-09-18 RX ADMIN — AZITHROMYCIN 255 MILLIGRAM(S): 500 TABLET, FILM COATED ORAL at 01:04

## 2023-09-18 RX ADMIN — Medication 2 UNIT(S): at 12:42

## 2023-09-18 RX ADMIN — BUDESONIDE AND FORMOTEROL FUMARATE DIHYDRATE 2 PUFF(S): 160; 4.5 AEROSOL RESPIRATORY (INHALATION) at 10:34

## 2023-09-18 RX ADMIN — Medication 3: at 17:11

## 2023-09-18 RX ADMIN — Medication 3: at 08:13

## 2023-09-18 RX ADMIN — CEFTRIAXONE 100 MILLIGRAM(S): 500 INJECTION, POWDER, FOR SOLUTION INTRAMUSCULAR; INTRAVENOUS at 12:46

## 2023-09-18 RX ADMIN — ENOXAPARIN SODIUM 40 MILLIGRAM(S): 100 INJECTION SUBCUTANEOUS at 06:20

## 2023-09-18 RX ADMIN — Medication 3 MILLIGRAM(S): at 01:10

## 2023-09-18 RX ADMIN — GABAPENTIN 300 MILLIGRAM(S): 400 CAPSULE ORAL at 12:42

## 2023-09-18 RX ADMIN — PANTOPRAZOLE SODIUM 40 MILLIGRAM(S): 20 TABLET, DELAYED RELEASE ORAL at 06:20

## 2023-09-18 RX ADMIN — Medication 2 UNIT(S): at 08:13

## 2023-09-18 NOTE — CONSULT NOTE ADULT - SUBJECTIVE AND OBJECTIVE BOX
History of Present Illness:  Reason for Admission: [facility]  History of Present Illness:   57 y.o. male from home with h/o asthma, NIDDM, HTN, GERD, recent candida infection of the throat presents to the ED c/o  fever, chills, coughing up brownish sputum, wheezing, sob, lack of taste, myalgia, weakness x5 days, pt had EGD 3 mos ago-told with GERD, was given Prilosec, Pt denies HA, dizziness, N,V,D, abd pain.        Review of Systems:  Review of Systems: CONSTITUTIONAL: +  fever, chills, myalgia  EYES: No eye pain, visual disturbances, or discharge  ENT:  No difficulty hearing, tinnitus, vertigo; No sinus or throat pain  NECK: No pain or stiffness  RESPIRATORY: +  cough, wheezing, chills No hemoptysis; + Shortness of Breath  CARDIOVASCULAR: No chest pain, palpitations, passing out, dizziness, or leg swelling  GASTROINTESTINAL: No abdominal or epigastric pain. No nausea, vomiting, or hematemesis; No diarrhea or constipation. No melena or hematochezia.  GENITOURINARY: No dysuria, frequency, hematuria, or incontinence  NEUROLOGICAL: No headaches, memory loss, loss of strength, numbness, or tremors  SKIN: No itching, burning, rashes, or lesions   LYMPH Nodes: No enlarged glands  ENDOCRINE: No heat or cold intolerance; No hair loss  MUSCULOSKELETAL: No joint pain or swelling; No muscle, back, No extremity pain  PSYCHIATRIC: No depression, anxiety, mood swings, or difficulty sleeping  HEME/LYMPH: No easy bruising, or bleeding gums  ALLERGY AND IMMUNOLOGIC: No hives or eczema      Allergies and Intolerances:        Allergies:  	Tylenol: Drug, Other, states sometimes with palpitations    Home Medications:   * Patient Currently Takes Medications as of 18-Nov-2021 07:04 documented in Structured Notes  · 	promethazine-dextromethorphan 6.25 mg-15 mg/5 mL oral syrup: 5 milliliter(s) orally every 6 hours     .    Patient History:    Past Medical, Past Surgical, and Family History:  PAST MEDICAL HISTORY:  Asthma     DM (diabetes mellitus)     GERD (gastroesophageal reflux disease)     HTN (hypertension).     PAST SURGICAL HISTORY:  No significant past surgical history.     FAMILY HISTORY:  No pertinent family history in first degree relatives.     Social History:  · Substance use	No  · Social History (marital status, living situation, occupation, and sexual history)	Denies smoking, alcohol, illicit drug use. Lives at home with family    ICU Vital Signs Last 24 Hrs  T(C): 36.6 (18 Sep 2023 05:09), Max: 36.6 (18 Sep 2023 05:09)  T(F): 97.8 (18 Sep 2023 05:09), Max: 97.8 (18 Sep 2023 05:09)  HR: 69 (18 Sep 2023 05:09) (69 - 105)  BP: 135/75 (18 Sep 2023 05:09) (109/88 - 135/75)  BP(mean): --  ABP: --  ABP(mean): --  RR: 18 (18 Sep 2023 05:09) (18 - 18)  SpO2: 95% (18 Sep 2023 05:09) (94% - 96%)    O2 Parameters below as of 18 Sep 2023 05:09  Patient On (Oxygen Delivery Method): room air      Physical Exam:   Physical Exam: GENERAL: NAD, well-groomed, well-developed  HEAD:  Atraumatic, Normocephalic  EYES: EOMI, PERRLA, conjunctiva and sclera clear  ENMT: No tonsillar erythema, exudates, or enlargement; Moist mucous membranes, Good dentition, No lesions  NECK: Supple, normal appearance, No JVD; Normal thyroid; Trachea midline  NERVOUS SYSTEM:  Alert & Oriented X3,  Motor Strength 5/5 B/L upper and lower extremities, sensation intact  CHEST/LUNG: + rales, rhonchi in the b/l bases, and  mild wheezing   HEART: Regular rate and rhythm; No murmurs, rubs, or gallops  ABDOMEN: Soft, Nontender, Nondistended; Bowel sounds present  EXTREMITIES:  2+ Peripheral Pulses, No clubbing, cyanosis, or edema  LYMPH: No lymphadenopathy noted  SKIN: No rashes or lesions;  Good capillary refill    LABS:                        13.6   12.76 )-----------( 156      ( 18 Sep 2023 06:02 )             39.9   09-18    141  |  108  |  20<H>  ----------------------------<  334<H>  3.9   |  25  |  1.11    Ca    8.7      18 Sep 2023 06:02  Phos  2.5     09-18  Mg     2.5     09-18    TPro  6.7  /  Alb  2.9<L>  /  TBili  0.5  /  DBili  x   /  AST  17  /  ALT  82<H>  /  AlkPhos  72  09-18    RADIOLOGY  < from: CT Chest No Cont (09.16.23 @ 21:12) >  ACC: [id number] EXAM:  CT CHEST   ORDERED BY: JOON BARILLAS     PROCEDURE DATE:  09/16/2023          INTERPRETATION:  CLINICAL INFORMATION: Shortness of breath, fever.    COMPARISON: 8/28/2023    CONTRAST/COMPLICATIONS:  IV Contrast: NONE  Oral Contrast: NONE  Complications: None reported at time of study completion    PROCEDURE:  CT of the Chest was performed.  Sagittal and coronal reformats were performed.    FINDINGS:    LUNGS AND AIRWAYS: Patent central airways.  Multifocal nodular patchy   opacities in both lower lobes and right middle lobe most prominent in the   left lower lobe. There is no consolidation or atelectasis.  PLEURA: No pleural effusion.  MEDIASTINUM AND ANASTACIO: No lymphadenopathy.  VESSELS: Within normal limits.  HEART: Heart size is normal. No pericardial effusion.  CHEST WALL AND LOWER NECK: Within normal limits.  VISUALIZED UPPER ABDOMEN: Apparent wall thickening of the stomach which   may be due to lack of full distention. If clinically relevant, endoscopy   correlation is recommended.  BONES: Within normal limits.    IMPRESSION:  New nodular patchy opacities in both lower lobes and right middle lobe,   consistent with pneumonia.    Apparent gastric wall thickening-lack of distention versus pathology.   Recommendclinical correlation and endoscopy if necessary.    --- End of Report ---              < end of copied text >

## 2023-09-18 NOTE — DISCHARGE NOTE PROVIDER - NSDCMRMEDTOKEN_GEN_ALL_CORE_FT
Advair Diskus 500 mcg-50 mcg inhalation powder: 1 application inhaled once a day  azithromycin 500 mg oral tablet: 1 tab(s) orally once a day  bisoprolol 5 mg oral tablet: 1 tab(s) orally once a day  cefpodoxime 200 mg oral tablet: 1 tab(s) orally 2 times a day  gabapentin 300 mg oral capsule: 1 cap(s) orally once a day  glipiZIDE 5 mg oral tablet: 1 tab(s) orally once a day  loratadine 10 mg oral capsule: 1 cap(s) orally once a day  omeprazole 40 mg oral delayed release capsule: 1 cap(s) orally once a day  Singulair 10 mg oral tablet: 1 tab(s) orally once a day

## 2023-09-18 NOTE — PHARMACOTHERAPY INTERVENTION NOTE - COMMENTS
Recommended to change Montelukast from Daily to HS (at bedtime). 
Recommended to d/c nebs x3 doses and order around the clock or prn if nebs are needed.  Pt is currently on Symbicort BID.

## 2023-09-18 NOTE — PROGRESS NOTE ADULT - ASSESSMENT
57M PMH asthma, DM, HTN, GERD, p/w with cc SOB, cough, fevers, found with CT evidence of pneumonia. Admission for pneumonia, sepsis, marli.    AP:  CAP  sepsis  MARLI  DM  HTN  GERD  Asthma    -c/w CTX and AZT  -f/u pneumonia work up  -MARLI improving with fluids, likely pre-renal from sepsis  -c/w ISS for DM, will add standing insulin based on needs  -holding home HTN meds, BP wnl  -resume home PPI for GERD  -c/w home inhalers, no sig wheezing this AM  -dvt ppx 
57 y.o. male with h/o asthma, NIDDM, HTN, GERD, p/w fever, chills, coughing up brownish sputum, wheezing, sob, lack of taste, myalgia, weakness x5 days, Pt is admitted for PNA.

## 2023-09-18 NOTE — DISCHARGE NOTE PROVIDER - NSDCFUSCHEDAPPT_GEN_ALL_CORE_FT
Jesse Andujar  [facility]  [address]  Scheduled Appointment: 09/19/2023    [facility]  [address] [place] [address] R  Scheduled Appointment: 09/22/2023

## 2023-09-18 NOTE — PROGRESS NOTE ADULT - PROBLEM SELECTOR PLAN 3
- Patient takes glipizide at home  - will hold home medications  - started lantus, lispro and correctional insulin  - [id number]  - diabetic diet

## 2023-09-18 NOTE — PROGRESS NOTE ADULT - PROBLEM SELECTOR PLAN 2
Pt w/ SCr 1.3  on admission  Baseline SCr unknown  F/U Urine Lytes, calculate FeNa  IVF for now, follow BMP daily

## 2023-09-18 NOTE — PROGRESS NOTE ADULT - PROBLEM SELECTOR PLAN 6
- hx of asthma takes singulair and advair at home  - mild B/L wheezing on exam  - c/w home meds  CT Chest shows New nodular patchy opacities in both lower lobes and right middle lobe,

## 2023-09-18 NOTE — CONSULT NOTE ADULT - ASSESSMENT
57M with CAP  clinically stable  no need for supplemental O2  Imaging personally reviewed by me.    Agree with abx to cover for CAP.  No evidence of asthma exacerbation  cont home ICS/LABA, prn albuterol and singulair    Thank you for this consult, will follow with you

## 2023-09-18 NOTE — PROGRESS NOTE ADULT - SUBJECTIVE AND OBJECTIVE BOX
PGY-1 Progress Note discussed with attending    PAGER #: [[phone]] TILL 5:00 PM  PLEASE CONTACT ON CALL TEAM:  - On Call Team (Please refer to Felix) FROM 5:00 PM - 8:30PM  - Nightfloat Team FROM 8:30 -7:30 AM    CHIEF COMPLAINT & BRIEF HOSPITAL COURSE:    INTERVAL HPI/OVERNIGHT EVENTS:       REVIEW OF SYSTEMS:  CONSTITUTIONAL: No fever, weight loss, or fatigue  RESPIRATORY: No cough, wheezing, chills or hemoptysis; No shortness of breath  CARDIOVASCULAR: No chest pain, palpitations, dizziness, or leg swelling  GASTROINTESTINAL: No abdominal pain. No nausea, vomiting, or hematemesis; No diarrhea or constipation. No melena or hematochezia.  GENITOURINARY: No dysuria or hematuria, urinary frequency  NEUROLOGICAL: No headaches, memory loss, loss of strength, numbness, or tremors  SKIN: No itching, burning, rashes, or lesions     Vital Signs Last 24 Hrs  T(C): 36.6 (18 Sep 2023 05:09), Max: 36.6 (18 Sep 2023 05:09)  T(F): 97.8 (18 Sep 2023 05:09), Max: 97.8 (18 Sep 2023 05:09)  HR: 69 (18 Sep 2023 05:09) (69 - 105)  BP: 135/75 (18 Sep 2023 05:09) (109/88 - 135/75)  BP(mean): --  RR: 18 (18 Sep 2023 05:09) (18 - 18)  SpO2: 95% (18 Sep 2023 05:09) (94% - 96%)    Parameters below as of 18 Sep 2023 05:09  Patient On (Oxygen Delivery Method): room air        PHYSICAL EXAMINATION:  GENERAL: NAD, well built  HEAD:  Atraumatic, Normocephalic  EYES:  conjunctiva and sclera clear  NECK: Supple, No JVD, Normal thyroid  CHEST/LUNG: Clear to auscultation. Clear to percussion bilaterally; No rales, rhonchi, wheezing, or rubs  HEART: Regular rate and rhythm; No murmurs, rubs, or gallops  ABDOMEN: Soft, Nontender, Nondistended; Bowel sounds present  NERVOUS SYSTEM:  Alert & Oriented X3,    EXTREMITIES:  2+ Peripheral Pulses, No clubbing, cyanosis, or edema  SKIN: warm dry                          13.6   12.76 )-----------( 156      ( 18 Sep 2023 06:02 )             39.9     09-18    141  |  108  |  20<H>  ----------------------------<  334<H>  3.9   |  25  |  1.11    Ca    8.7      18 Sep 2023 06:02  Phos  2.5     09-18  Mg     2.5     09-18    TPro  6.7  /  Alb  2.9<L>  /  TBili  0.5  /  DBili  x   /  AST  17  /  ALT  82<H>  /  AlkPhos  72  09-18    LIVER FUNCTIONS - ( 18 Sep 2023 06:02 )  Alb: 2.9 g/dL / Pro: 6.7 g/dL / ALK PHOS: 72 U/L / ALT: 82 U/L DA / AST: 17 U/L / GGT: x               PT/INR - ( 16 Sep 2023 18:40 )   PT: 13.8 sec;   INR: 1.22 ratio         PTT - ( 16 Sep 2023 18:40 )  PTT:35.2 sec    CAPILLARY BLOOD GLUCOSE      RADIOLOGY & ADDITIONAL TESTS:                   PGY-1 Progress Note discussed with attending    PAGER #: [[phone]] TILL 5:00 PM  PLEASE CONTACT ON CALL TEAM:  - On Call Team (Please refer to Felix) FROM 5:00 PM - 8:30PM  - Nightfloat Team FROM 8:30 -7:30 AM        INTERVAL HPI/OVERNIGHT EVENTS: Pt examined at bedside, appears comfortable, offers no complaints      REVIEW OF SYSTEMS:  CONSTITUTIONAL: No fever, weight loss, or fatigue  RESPIRATORY: No cough, wheezing, chills or hemoptysis; No shortness of breath  CARDIOVASCULAR: No chest pain, palpitations, dizziness, or leg swelling  GASTROINTESTINAL: No abdominal pain. No nausea, vomiting, or hematemesis; No diarrhea or constipation. No melena or hematochezia.  GENITOURINARY: No dysuria or hematuria, urinary frequency  NEUROLOGICAL: No headaches, memory loss, loss of strength, numbness, or tremors  SKIN: No itching, burning, rashes, or lesions     Vital Signs Last 24 Hrs  T(C): 36.6 (18 Sep 2023 05:09), Max: 36.6 (18 Sep 2023 05:09)  T(F): 97.8 (18 Sep 2023 05:09), Max: 97.8 (18 Sep 2023 05:09)  HR: 69 (18 Sep 2023 05:09) (69 - 105)  BP: 135/75 (18 Sep 2023 05:09) (109/88 - 135/75)  BP(mean): --  RR: 18 (18 Sep 2023 05:09) (18 - 18)  SpO2: 95% (18 Sep 2023 05:09) (94% - 96%)    Parameters below as of 18 Sep 2023 05:09  Patient On (Oxygen Delivery Method): room air        PHYSICAL EXAMINATION:  GENERAL: NAD, well built  HEAD:  Atraumatic, Normocephalic  EYES:  conjunctiva and sclera clear  NECK: Supple, No JVD, Normal thyroid  CHEST/LUNG: Clear to auscultation. No rales, rhonchi, wheezing, or rubs  HEART: Regular rate and rhythm; No murmurs, rubs, or gallops  ABDOMEN: Soft, Nontender, Nondistended; Bowel sounds present  NERVOUS SYSTEM:  Alert & Oriented X3,    EXTREMITIES:  2+ Peripheral Pulses, No clubbing, cyanosis, or edema  SKIN: warm dry                          13.6   12.76 )-----------( 156      ( 18 Sep 2023 06:02 )             39.9     09-18    141  |  108  |  20<H>  ----------------------------<  334<H>  3.9   |  25  |  1.11    Ca    8.7      18 Sep 2023 06:02  Phos  2.5     09-18  Mg     2.5     09-18    TPro  6.7  /  Alb  2.9<L>  /  TBili  0.5  /  DBili  x   /  AST  17  /  ALT  82<H>  /  AlkPhos  72  09-18    LIVER FUNCTIONS - ( 18 Sep 2023 06:02 )  Alb: 2.9 g/dL / Pro: 6.7 g/dL / ALK PHOS: 72 U/L / ALT: 82 U/L DA / AST: 17 U/L / GGT: x               PT/INR - ( 16 Sep 2023 18:40 )   PT: 13.8 sec;   INR: 1.22 ratio         PTT - ( 16 Sep 2023 18:40 )  PTT:35.2 sec    CAPILLARY BLOOD GLUCOSE      RADIOLOGY & ADDITIONAL TESTS:

## 2023-09-18 NOTE — PROGRESS NOTE ADULT - PROBLEM SELECTOR PLAN 1
p/w fever, chills, coughing up brownish sputum, wheezing, sob, lack of taste, myalgia, weakness x5 days  CXR was clear  CT Chest shows New nodular patchy opacities in both lower lobes and right middle lobe,   consistent with pneumonia.  On azithromycin 500mg qd + rocephin 1g qd  Abx transitioned to oral  Bcx- negative

## 2023-09-18 NOTE — DISCHARGE NOTE NURSING/CASE MANAGEMENT/SOCIAL WORK - PATIENT PORTAL LINK FT
You can access the FollowMyHealth Patient Portal offered by [facility] by registering at the following website: [email or web address]. By joining [employer]’s FollowMyHealth portal, you will also be able to view your health information using other applications (apps) compatible with our system.

## 2023-09-18 NOTE — DISCHARGE NOTE PROVIDER - NSDCCPCAREPLAN_GEN_ALL_CORE_FT
PRINCIPAL DISCHARGE DIAGNOSIS  Diagnosis: Pneumonia  Assessment and Plan of Treatment: You presented to emergency room with fever, chills, coughing up brownish sputum, wheezing, sob, lack of taste, myalgia, weakness x5 days. Your chest Xray was clear. CT chest was done which showed new nodular patchy opacities in both lower lobes and right middle lobe consistent with pneumonia. You were started on Azithromycin  and Rocephin. Blood culture was negative./////////We are prescribing you  AZITHROMYCIN 500mg ONE DOSE TOMORROW to complete 3 day course and CEFPODOXIME 200mg TWICE A DAY FOR 5 DAYS.////////Please take the medications as prescribed after your discharge.////////Please follow up with your PCP within 1 week of discharge.        SECONDARY DISCHARGE DIAGNOSES  Diagnosis: DM (diabetes mellitus)  Assessment and Plan of Treatment: You have a history of diabetes. Your HbA1c was 9.2 during this admission. Your blood glucose remained fairly high during your stay in the hospital.   You need to continue monitoring your blood sugar levels closely and maintain healthy lifestyle by eating healthy diabetic regimen, weight loss and exercise regularly as tolerated.  Please continue to take your HOME medications as prescribed.   Please follow up with your PCP/Endocrinologist within a week of discharge to adjust the dosage.      Diagnosis: Asthma  Assessment and Plan of Treatment: You have history of Asthma which is an airway inflammation with increased mucus production that makes it difficult to breath. You are taking Singulair and Advair at home. You were treated with Duoneb, Symbicort and Solumedrol in the hospital. Please continue to take your HOME medications as prescribed and follow up with your PCP in a week from discharge to adjust medications as needed.      Diagnosis: MARLI (acute kidney injury)  Assessment and Plan of Treatment: You were diagnosed with acute kidney injury. Your creatinine was found to be elevated around 1.3 on admission. You were treated with IV fluids to help normalize. You are recommended to follow up with your PCP in a week from discharge.       Diagnosis: GERD (gastroesophageal reflux disease)  Assessment and Plan of Treatment: You have history of GERD- gastroesophageal reflux which is a chronic disease that occurs when stomach acid or bile flows into the food pipe and irritates the lining. You were taking OMEPRAZOLE AT HOME. Please continue to take your HOME medications and follow up with your PCP / Gastroenterologist in a week from discharge.      Diagnosis: HTN (hypertension)  Assessment and Plan of Treatment: You have a history of Hypertension. On this admission, your Blood Pressure was adequately controlled without medications.  Your blood pressure target is 120-140/80-90, maintain healthy lifestyle, low salt diet, avoid fatty food, weight loss, exercise regularly or stay active as tolerated 30 mins X 3 times per week.  Notify your doctor if you have any of the following symptoms:   (Dizziness, Lightheadedness, Blurry vision, Headache, Chest pain, Shortness of breath.)  Please continue taking your HOME medications and follow-up with your PCP in 1 week from discharge.       PRINCIPAL DISCHARGE DIAGNOSIS  Diagnosis: Pneumonia  Assessment and Plan of Treatment: You presented to emergency room with fever, chills, coughing up brownish sputum, wheezing, sob, lack of taste, myalgia, weakness x5 days. Your chest Xray was clear. CT chest was done which showed new nodular patchy opacities in both lower lobes and right middle lobe consistent with pneumonia. You were started on Azithromycin  and Rocephin. Blood culture was negative./////////We are prescribing you  AZITHROMYCIN 500mg ONE DOSE TOMORROW to complete 3 day course and CEFPODOXIME 200mg TWICE A DAY FOR 5 DAYS.////////Please take the medications as prescribed after your discharge.////////Please follow up with your PCP within 1 week of discharge.        SECONDARY DISCHARGE DIAGNOSES  Diagnosis: DM (diabetes mellitus)  Assessment and Plan of Treatment: You have a history of diabetes. Your HbA1c was 9.2 during this admission. Your blood glucose remained fairly high during your stay in the hospital.   You need to continue monitoring your blood sugar levels closely and maintain healthy lifestyle by eating healthy diabetic regimen, weight loss and exercise regularly as tolerated.  Please continue to take your HOME medications as prescribed.   Please follow up with your PCP/Endocrinologist within a week of discharge to adjust the dosage.      Diagnosis: Asthma  Assessment and Plan of Treatment: You have history of Asthma which is an airway inflammation with increased mucus production that makes it difficult to breath. You are taking Singulair and Advair at home. You were treated with Duoneb, Symbicort and Solumedrol in the hospital. Please continue to take your HOME medications as prescribed and follow up with your PCP in a week from discharge to adjust medications as needed.      Diagnosis: HTN (hypertension)  Assessment and Plan of Treatment: You have a history of Hypertension. On this admission, your Blood Pressure was adequately controlled without medications.  Your blood pressure target is 120-140/80-90, maintain healthy lifestyle, low salt diet, avoid fatty food, weight loss, exercise regularly or stay active as tolerated 30 mins X 3 times per week.  Notify your doctor if you have any of the following symptoms:   (Dizziness, Lightheadedness, Blurry vision, Headache, Chest pain, Shortness of breath.)  Please continue taking your HOME medications and follow-up with your PCP in 1 week from discharge.      Diagnosis: GERD (gastroesophageal reflux disease)  Assessment and Plan of Treatment: You have history of GERD- gastroesophageal reflux which is a chronic disease that occurs when stomach acid or bile flows into the food pipe and irritates the lining. You were taking OMEPRAZOLE AT HOME. Please continue to take your HOME medications and follow up with your PCP / Gastroenterologist in a week from discharge.      Diagnosis: MARLI (acute kidney injury)  Assessment and Plan of Treatment: You were diagnosed with acute kidney injury. Your creatinine was found to be elevated around 1.3 on admission. You were treated with IV fluids to help normalize. You are recommended to follow up with your PCP in a week from discharge.       Diagnosis: Sepsis  Assessment and Plan of Treatment:     Diagnosis: Sepsis due to pneumonia  Assessment and Plan of Treatment:      PRINCIPAL DISCHARGE DIAGNOSIS  Diagnosis: Pneumonia  Assessment and Plan of Treatment: You presented to emergency room with fever, chills, coughing up brownish sputum, wheezing, sob, lack of taste, myalgia, weakness x5 days. Your chest Xray was clear. CT chest was done which showed new nodular patchy opacities in both lower lobes and right middle lobe consistent with pneumonia. You were started on Azithromycin  and Rocephin. Blood culture was negative./////////We are prescribing you  AZITHROMYCIN 500mg ONE DOSE TOMORROW to complete 3 day course and CEFPODOXIME 200mg TWICE A DAY FOR 3 DAYS  //////// Please take the medications as prescribed after your discharge.////////Please follow up with your PCP within 1 week of discharge.        SECONDARY DISCHARGE DIAGNOSES  Diagnosis: DM (diabetes mellitus)  Assessment and Plan of Treatment: You have a history of diabetes. Your HbA1c was 9.2 during this admission. Your blood glucose remained fairly high during your stay in the hospital.   You need to continue monitoring your blood sugar levels closely and maintain healthy lifestyle by eating healthy diabetic regimen, weight loss and exercise regularly as tolerated.  Please continue to take your HOME medications as prescribed.   Please follow up with your PCP/Endocrinologist within a week of discharge to adjust the dosage.      Diagnosis: Asthma  Assessment and Plan of Treatment: You have history of Asthma which is an airway inflammation with increased mucus production that makes it difficult to breath. You are taking Singulair and Advair at home. You were treated with Duoneb, Symbicort and Solumedrol in the hospital. Please continue to take your HOME medications as prescribed and follow up with your PCP in a week from discharge to adjust medications as needed.      Diagnosis: MARLI (acute kidney injury)  Assessment and Plan of Treatment: You were diagnosed with acute kidney injury. Your creatinine was found to be elevated around 1.3 on admission. You were treated with IV fluids to help normalize. You are recommended to follow up with your PCP in a week from discharge.       Diagnosis: GERD (gastroesophageal reflux disease)  Assessment and Plan of Treatment: You have history of GERD- gastroesophageal reflux which is a chronic disease that occurs when stomach acid or bile flows into the food pipe and irritates the lining. You were taking OMEPRAZOLE AT HOME. Please continue to take your HOME medications and follow up with your PCP / Gastroenterologist in a week from discharge.      Diagnosis: HTN (hypertension)  Assessment and Plan of Treatment: You have a history of Hypertension. On this admission, your Blood Pressure was adequately controlled without medications.  Your blood pressure target is 120-140/80-90, maintain healthy lifestyle, low salt diet, avoid fatty food, weight loss, exercise regularly or stay active as tolerated 30 mins X 3 times per week.  Notify your doctor if you have any of the following symptoms:   (Dizziness, Lightheadedness, Blurry vision, Headache, Chest pain, Shortness of breath.)  Please continue taking your HOME medications and follow-up with your PCP in 1 week from discharge.      Diagnosis: Sepsis  Assessment and Plan of Treatment:     Diagnosis: Sepsis due to pneumonia  Assessment and Plan of Treatment:

## 2023-09-18 NOTE — DISCHARGE NOTE NURSING/CASE MANAGEMENT/SOCIAL WORK - NSDCPEFALRISK_GEN_ALL_CORE
For information on Fall & Injury Prevention, visit: [email or web address] OR  [email or web address] OR  https://www.cdc.gov/steadi/patient.html

## 2023-09-18 NOTE — PROGRESS NOTE ADULT - PROBLEM SELECTOR PLAN 7
hx of candida infection in the throat   pt was treated with 10 days of fluconazole  no abnormalities on physical exam  monitor for any signs of candida infection  f/u fungitell

## 2023-09-18 NOTE — DISCHARGE NOTE PROVIDER - ATTENDING DISCHARGE PHYSICAL EXAMINATION:
Gen: NAD  Neuro: alert, answering qs appropriately, moves all extremities  HEENT: anicteric, moist oral mucosa  Neck: supple, no JVD elevation  Cards: RRR  Pulm: good inspiratory effort, CTAB  Abd: soft, NT/ND, BS+  Ext: no edema  Skin: warm, dry   Gen: NAD  Neuro: alert, answering qs appropriately, moves all extremities  HEENT: anicteric, moist oral mucosa  Neck: supple, no JVD elevation  Cards: RRR  Pulm: good inspiratory effort, CTAB  Abd: soft, NT/ND, BS+  Ext: no edema  Skin: warm, dry  ==============================   addendum: sepsis ruled in on admission likely due to pneumonia

## 2023-09-19 ENCOUNTER — APPOINTMENT (OUTPATIENT)
Dept: OTOLARYNGOLOGY | Facility: CLINIC | Age: 58
End: 2023-09-19

## 2023-09-19 LAB
1,3 BETA GLUCAN SER QL: NEGATIVE — SIGNIFICANT CHANGE UP
CULTURE RESULTS: SIGNIFICANT CHANGE UP
FUNGITELL: <31 PG/ML — SIGNIFICANT CHANGE UP
SPECIMEN SOURCE: SIGNIFICANT CHANGE UP

## 2023-09-20 LAB
M PNEUMO IGM SER-ACNC: 0.73 INDEX — SIGNIFICANT CHANGE UP (ref 0–0.9)
MYCOPLASMA AG SPEC QL: NEGATIVE — SIGNIFICANT CHANGE UP

## 2023-09-22 ENCOUNTER — APPOINTMENT (OUTPATIENT)
Dept: OTOLARYNGOLOGY | Facility: CLINIC | Age: 58
End: 2023-09-22

## 2023-09-22 ENCOUNTER — NON-APPOINTMENT (OUTPATIENT)
Age: 58
End: 2023-09-22

## 2023-09-22 LAB
CULTURE RESULTS: SIGNIFICANT CHANGE UP
CULTURE RESULTS: SIGNIFICANT CHANGE UP
SPECIMEN SOURCE: SIGNIFICANT CHANGE UP
SPECIMEN SOURCE: SIGNIFICANT CHANGE UP

## 2023-10-10 PROBLEM — I10 ESSENTIAL (PRIMARY) HYPERTENSION: Chronic | Status: ACTIVE | Noted: 2023-09-17

## 2023-10-10 PROBLEM — E11.9 TYPE 2 DIABETES MELLITUS WITHOUT COMPLICATIONS: Chronic | Status: ACTIVE | Noted: 2023-09-17

## 2023-10-10 PROBLEM — K21.9 GASTRO-ESOPHAGEAL REFLUX DISEASE WITHOUT ESOPHAGITIS: Chronic | Status: ACTIVE | Noted: 2023-09-17

## 2023-10-10 PROBLEM — J45.909 UNSPECIFIED ASTHMA, UNCOMPLICATED: Chronic | Status: ACTIVE | Noted: 2023-09-17

## 2023-10-13 ENCOUNTER — APPOINTMENT (OUTPATIENT)
Dept: OTOLARYNGOLOGY | Facility: CLINIC | Age: 58
End: 2023-10-13
Payer: MEDICAID

## 2023-10-13 VITALS
SYSTOLIC BLOOD PRESSURE: 107 MMHG | OXYGEN SATURATION: 98 % | DIASTOLIC BLOOD PRESSURE: 75 MMHG | HEIGHT: 70 IN | BODY MASS INDEX: 22.76 KG/M2 | HEART RATE: 72 BPM | WEIGHT: 159 LBS

## 2023-10-13 PROCEDURE — 31575 DIAGNOSTIC LARYNGOSCOPY: CPT

## 2023-10-13 PROCEDURE — 99214 OFFICE O/P EST MOD 30 MIN: CPT | Mod: 25

## 2023-10-13 RX ORDER — PREGABALIN 50 MG/1
50 CAPSULE ORAL 3 TIMES DAILY
Qty: 159 | Refills: 0 | Status: ACTIVE | OUTPATIENT
Start: 2023-10-13

## 2023-10-27 ENCOUNTER — APPOINTMENT (OUTPATIENT)
Dept: PULMONOLOGY | Facility: CLINIC | Age: 58
End: 2023-10-27
Payer: MEDICAID

## 2023-10-27 VITALS
TEMPERATURE: 98 F | RESPIRATION RATE: 15 BRPM | BODY MASS INDEX: 23.8 KG/M2 | DIASTOLIC BLOOD PRESSURE: 79 MMHG | WEIGHT: 166.25 LBS | OXYGEN SATURATION: 94 % | HEIGHT: 70 IN | HEART RATE: 64 BPM | SYSTOLIC BLOOD PRESSURE: 114 MMHG

## 2023-10-27 DIAGNOSIS — J45.40 MODERATE PERSISTENT ASTHMA, UNCOMPLICATED: ICD-10-CM

## 2023-10-27 PROCEDURE — 99213 OFFICE O/P EST LOW 20 MIN: CPT

## 2023-12-08 ENCOUNTER — APPOINTMENT (OUTPATIENT)
Dept: OTOLARYNGOLOGY | Facility: CLINIC | Age: 58
End: 2023-12-08
Payer: MEDICAID

## 2023-12-08 VITALS
SYSTOLIC BLOOD PRESSURE: 134 MMHG | RESPIRATION RATE: 17 BRPM | DIASTOLIC BLOOD PRESSURE: 84 MMHG | OXYGEN SATURATION: 96 % | HEIGHT: 70 IN | HEART RATE: 70 BPM | WEIGHT: 170 LBS | BODY MASS INDEX: 24.34 KG/M2

## 2023-12-08 DIAGNOSIS — R05.9 COUGH, UNSPECIFIED: ICD-10-CM

## 2023-12-08 DIAGNOSIS — R49.0 DYSPHONIA: ICD-10-CM

## 2023-12-08 PROCEDURE — 99214 OFFICE O/P EST MOD 30 MIN: CPT | Mod: 25

## 2023-12-08 PROCEDURE — 31579 LARYNGOSCOPY TELESCOPIC: CPT

## 2023-12-08 RX ORDER — CLOTRIMAZOLE 10 MG/1
10 LOZENGE ORAL 3 TIMES DAILY
Qty: 90 | Refills: 3 | Status: ACTIVE | COMMUNITY
Start: 2023-12-08 | End: 1900-01-01

## 2023-12-08 RX ORDER — PREGABALIN 50 MG/1
50 CAPSULE ORAL AT BEDTIME
Qty: 60 | Refills: 0 | Status: ACTIVE | COMMUNITY
Start: 2023-12-08 | End: 1900-01-01

## 2023-12-08 RX ORDER — GABAPENTIN 300 MG/1
300 CAPSULE ORAL
Qty: 90 | Refills: 2 | Status: COMPLETED | COMMUNITY
Start: 2023-08-15 | End: 2023-12-08

## 2023-12-08 RX ORDER — FLUCONAZOLE 200 MG/1
200 TABLET ORAL
Qty: 10 | Refills: 0 | Status: COMPLETED | COMMUNITY
Start: 2023-12-08 | End: 2023-12-18

## 2024-01-16 ENCOUNTER — APPOINTMENT (OUTPATIENT)
Dept: OTOLARYNGOLOGY | Facility: CLINIC | Age: 59
End: 2024-01-16
Payer: MEDICAID

## 2024-01-16 VITALS
BODY MASS INDEX: 24.34 KG/M2 | HEART RATE: 65 BPM | SYSTOLIC BLOOD PRESSURE: 132 MMHG | DIASTOLIC BLOOD PRESSURE: 80 MMHG | HEIGHT: 70 IN | WEIGHT: 170 LBS

## 2024-01-16 PROCEDURE — 99213 OFFICE O/P EST LOW 20 MIN: CPT | Mod: 25

## 2024-01-16 PROCEDURE — 31575 DIAGNOSTIC LARYNGOSCOPY: CPT

## 2024-01-16 NOTE — PROCEDURE
[de-identified] : Procedure: Transnasal flexible laryngoscopy  Description: Informed consent was obtained from the patient prior to the procedure. The patient was seated in the clinic chair. Topical anesthesia was achieved by first spraying the nasal cavities with 4% lidocaine and 1% phenylephrine.   Exam: This demonstrates a clear vallecula and crisp epiglottis. The aryepiglottic folds are intact and symmetric bilaterally. The hypopharynx does not demonstrate pooling. The interarytenoid space demonstrates no lesions. It does not demonstrate pachydermia. The false vocal folds are symmetric and without lesions or masses. False fold voicing (plica ventricularis) is not noted today. The true vocal folds show normal and symmetric motion bilaterally. There is no paradoxical motion. The right medial edge is crisp and shows no lesions or masses. The left medial edge is crisp and shows no lesions or masses. The mucosal covering is minimally edematous. There is not erythema present today. There are no obvious vascular ectasias present. The vocal processes do not demonstrate granulomas. The subglottis and proximal trachea is clear and unobstructed to the limits of the examination today.

## 2024-01-16 NOTE — REASON FOR VISIT
[Subsequent Evaluation] : a subsequent evaluation for [FreeTextEntry2] : follow up chronic cough, recurrent/chronic fungal laryngitis, and dysphonia.

## 2024-01-16 NOTE — ASSESSMENT
[FreeTextEntry1] : Assessment/Plan: #1 Idiopathic chronic cough #2 Recurrent/chronic fungal laryngitis  Continue pregabalin 1 tab nightly for 1 more week and then stop.  I would like to see him back in 3 months to make sure he does not get a recurrence of laryngeal candidiasis as he has had multiple instances of this recently.  He will continue Advair 2 puffs at night.    Will continue on Reglan from his PCP.

## 2024-04-23 ENCOUNTER — APPOINTMENT (OUTPATIENT)
Dept: OTOLARYNGOLOGY | Facility: CLINIC | Age: 59
End: 2024-04-23
Payer: MEDICAID

## 2024-04-23 VITALS
BODY MASS INDEX: 24.05 KG/M2 | DIASTOLIC BLOOD PRESSURE: 85 MMHG | SYSTOLIC BLOOD PRESSURE: 133 MMHG | HEIGHT: 70 IN | WEIGHT: 168 LBS

## 2024-04-23 DIAGNOSIS — R05.3 CHRONIC COUGH: ICD-10-CM

## 2024-04-23 DIAGNOSIS — J37.0 CHRONIC LARYNGITIS: ICD-10-CM

## 2024-04-23 DIAGNOSIS — B49 CHRONIC LARYNGITIS: ICD-10-CM

## 2024-04-23 PROCEDURE — 99213 OFFICE O/P EST LOW 20 MIN: CPT | Mod: 25

## 2024-04-23 PROCEDURE — 31575 DIAGNOSTIC LARYNGOSCOPY: CPT

## 2024-04-23 NOTE — PROCEDURE
[de-identified] : Procedure: Transnasal flexible laryngoscopy  Description: Informed consent was obtained from the patient prior to the procedure. The patient was seated in the clinic chair. Topical anesthesia was achieved by first spraying the nasal cavities with 4% lidocaine and 1% phenylephrine.   Exam: This demonstrates a clear vallecula and crisp epiglottis. The aryepiglottic folds are intact and symmetric bilaterally. The hypopharynx does not demonstrate pooling. The interarytenoid space demonstrates no lesions. It does not demonstrate pachydermia. The false vocal folds are symmetric and without lesions or masses. False fold voicing (plica ventricularis) is not noted today. The true vocal folds show normal and symmetric motion bilaterally. There is no paradoxical motion. The right medial edge is crisp and shows no lesions or masses. The left medial edge is crisp and shows no lesions or masses. The mucosal covering is minimally edematous. There is not erythema present today. There are no obvious vascular ectasias present. The vocal processes do not demonstrate granulomas. The subglottis and proximal trachea is clear and unobstructed to the limits of the examination today.

## 2024-04-23 NOTE — ASSESSMENT
[FreeTextEntry1] : Assessment/Plan: #1 Idiopathic chronic cough- now resolved #2 Recurrent/chronic fungal laryngitis  Patient is to follow up with me as needed for worsening cough or voice.

## 2024-04-23 NOTE — HISTORY OF PRESENT ILLNESS
[de-identified] : NIKOLAY FERNANDEZ is a 58 year old male who presents to the [facility] for follow up of his chronic cough and recurrent/chronic fungal laryngitis. I last saw the patient on 1/16/24. Patient was to continue pregabalin 1 tab nightly for 1 more week and then stop. I wanted to see him back in 3 months to make sure he does not get a recurrence of laryngeal candidiasis as he has had multiple instances of this previously. He was to continue Advair 2 puffs at night.   Patient reports he continues to use Advair at night. Reports his cough has improved by 90%. Reports he used pregabalin for 1 week.   CT chest w/o con performed on 9/16/23 shows: New nodular patchy opacities in both lower lobes and right middle lobe, consistent with pneumonia. Apparent gastric wall thickening-lack of distention versus pathology. Recommend clinical correlation and endoscopy if necessary.  CT chest w/o con performed on 8/28/23 shows: Clear lungs CXR 7/6/2023: normal Report saw GI and had EGD in Sept 2023 and reports it was normal.

## 2024-07-23 NOTE — CONSULT NOTE ADULT - PROVIDER SPECIALTY LIST ADULT
Medication:   Can consider at future visit. Currently desires to work on her eating plan including new eating plan for insulin resistance/exercise/sleep/stress management.    Nutrition:  Hx of reflux 2-3x/week at night. Food looked undigested.   Stopped eating at 3:30 pm, stopped eating heavy breads/pasta, still noticing reflux occurs.   F/u with PCP, started on Protonix 40 mg daily.  No longer having the above episodes.    See dietitian instructions. Follow dietitian recommended eating plan/goals.  Discussed in detail importance of getting enough protein focused small meals/snacks throughout the day and not skipping in order to meet with improved weight loss success.     Physical Activity:  See exercise physiologist instructions.   Follow exercise physiologist plan/goals.   Discussed importance of EP strength/resistance exercises to preserve lean muscle mass.     Sleep:  Much improved. Averages 6-7 hours/night. No longer having active choking episodes. Started probiotics 3 weeks ago. Wonders if that helped as well.    Psych/Spirtual/Emotional:   Feels much better than last time. Patient is coping with working out, sleeping better, just feels better. Discussed better options for stress management.    PMH/Physical:  Labs UTD    Pulmonology

## 2024-08-09 ENCOUNTER — APPOINTMENT (OUTPATIENT)
Dept: OTOLARYNGOLOGY | Facility: CLINIC | Age: 59
End: 2024-08-09

## 2024-08-09 PROCEDURE — 31579 LARYNGOSCOPY TELESCOPIC: CPT

## 2024-08-09 PROCEDURE — 99214 OFFICE O/P EST MOD 30 MIN: CPT | Mod: 25

## 2024-08-09 NOTE — PROCEDURE
[de-identified] : Stroboscopic Laryngoscopy Procedure Note:  Indication:	Assess laryngeal biomechanics and vocal fold oscillation.  Description of Procedure:	Informed consent was verbally obtained from the patient prior to the procedure. The patient was seated in the clinic chair. Topical anesthesia was achieved by first spraying the nasal cavities with 4% lidocaine and nasal decongestant.   Findings:  Supraglottis: no masses or lesions  Glottis:    Structure:                        Right: crisp and shows no lesions or masses                        Left:  crisp and shows no lesions or masses                 Mobility:                        Right:  normal                        Left:  normal                Amplitude:                        Right:  normal                       Left:  normal                Closure: complete                 Wave symmetry:  symmetric  Subglottis: no masses or lesions within the visualized subglottis Visualized airway is widely patent.

## 2024-08-09 NOTE — HISTORY OF PRESENT ILLNESS
[de-identified] : NIKOLAY FERNANDEZ is a 58 year old male who presents to the [facility] for follow up of his hx of chronic cough, asthma,  and recurrent/chronic fungal laryngitis. I last saw the patient on 4/23/24. Patient was to follow up with me as needed for worsening cough or voice. States cough is worse form last visit. Continues to use Advair inhaler. Voice continues to be hoarse with occasional "cracking." Cough and dysphonia has been for the past 1 month.    CT chest w/o con performed on 8/28/23 shows: Clear lungs  CXR 7/6/2023: normal Report saw GI and had EGD in Sept 2023 and reports it was normal.

## 2024-08-09 NOTE — PROCEDURE
[de-identified] : Stroboscopic Laryngoscopy Procedure Note:  Indication:	Assess laryngeal biomechanics and vocal fold oscillation.  Description of Procedure:	Informed consent was verbally obtained from the patient prior to the procedure. The patient was seated in the clinic chair. Topical anesthesia was achieved by first spraying the nasal cavities with 4% lidocaine and nasal decongestant.   Findings:  Supraglottis: no masses or lesions  Glottis:    Structure:                        Right: crisp and shows no lesions or masses                        Left:  crisp and shows no lesions or masses                 Mobility:                        Right:  normal                        Left:  normal                Amplitude:                        Right:  normal                       Left:  normal                Closure: complete                 Wave symmetry:  symmetric  Subglottis: no masses or lesions within the visualized subglottis Visualized airway is widely patent.

## 2024-08-09 NOTE — ASSESSMENT
[FreeTextEntry1] : Assessment/Plan:  #1 MTD  #2 Acute cough #3 Hx of recurrent fungal laryngitis #4 Asthma #5 Hx of chronic cough  I have recommended we restart pregabalin. Start with 50mg tid. The risks, benefits, and alternatives to care were discussed with the patient and understanding expressed. I have recommended he follow up in 1 month.

## 2024-08-09 NOTE — HISTORY OF PRESENT ILLNESS
[de-identified] : NIKOLAY FERNANDEZ is a 58 year old male who presents to the [facility] for follow up of his hx of chronic cough, asthma,  and recurrent/chronic fungal laryngitis. I last saw the patient on 4/23/24. Patient was to follow up with me as needed for worsening cough or voice. States cough is worse form last visit. Continues to use Advair inhaler. Voice continues to be hoarse with occasional "cracking." Cough and dysphonia has been for the past 1 month.    CT chest w/o con performed on 8/28/23 shows: Clear lungs  CXR 7/6/2023: normal Report saw GI and had EGD in Sept 2023 and reports it was normal.

## 2024-09-18 ENCOUNTER — NON-APPOINTMENT (OUTPATIENT)
Age: 59
End: 2024-09-18

## 2024-09-19 ENCOUNTER — APPOINTMENT (OUTPATIENT)
Dept: OTOLARYNGOLOGY | Facility: CLINIC | Age: 59
End: 2024-09-19
Payer: MEDICAID

## 2024-09-19 VITALS
SYSTOLIC BLOOD PRESSURE: 117 MMHG | BODY MASS INDEX: 25.77 KG/M2 | HEIGHT: 70 IN | DIASTOLIC BLOOD PRESSURE: 77 MMHG | OXYGEN SATURATION: 96 % | WEIGHT: 180 LBS | HEART RATE: 79 BPM

## 2024-09-19 DIAGNOSIS — R49.0 DYSPHONIA: ICD-10-CM

## 2024-09-19 DIAGNOSIS — R05.3 CHRONIC COUGH: ICD-10-CM

## 2024-09-19 PROCEDURE — 31579 LARYNGOSCOPY TELESCOPIC: CPT

## 2024-09-19 PROCEDURE — 99213 OFFICE O/P EST LOW 20 MIN: CPT | Mod: 25

## 2024-09-19 RX ORDER — PREGABALIN 50 MG/1
50 CAPSULE ORAL 3 TIMES DAILY
Qty: 90 | Refills: 0 | Status: ACTIVE | COMMUNITY
Start: 2024-09-19 | End: 1900-01-01

## 2024-09-19 NOTE — ASSESSMENT
[FreeTextEntry1] : Assessment/Plan: #1 MTD #2 Hx of recurrent fungal laryngitis #3 Asthma #4 Hx of chronic cough  I have recommended we increase pregabalin to tid and continue for another 2 months at which point I will see him back and likely start taper.  The risks, benefits, and alternatives to care were discussed with the patient and understanding expressed.. New script sent. Will continue Advair 1 puff daily.

## 2024-09-19 NOTE — REASON FOR VISIT
[Subsequent Evaluation] : a subsequent evaluation for [FreeTextEntry2] : follow up of his hx of chronic cough, MTD, asthma, and recurrent/chronic fungal laryngitis

## 2024-09-19 NOTE — HISTORY OF PRESENT ILLNESS
[de-identified] : NIKOLAY FERNANDEZ is a 58 year old male who presents to the [facility] for follow up of his hx of chronic cough, MTD, asthma, and recurrent/chronic fungal laryngitis. I last saw the patient on 8/9/24. At that time started on pregabalin with plan to follow up in 1 month. States cough was 60-70% better on pregabalin 50mg bid. States completed pregabalin yesterday (9/18/24). States voice and cough has improved since last visit.  Continues to use Advair inhaler 1x at night.   CT chest w/o con performed on 8/28/23 shows: Clear lungs  CXR 7/6/2023: normal Report saw GI and had EGD in Sept 2023 and reports it was normal.

## 2024-09-19 NOTE — PROCEDURE
[de-identified] : Stroboscopic Laryngoscopy Procedure Note:  Indication:	Assess laryngeal biomechanics and vocal fold oscillation.  Description of Procedure:	Informed consent was verbally obtained from the patient prior to the procedure. The patient was seated in the clinic chair. Topical anesthesia was achieved by first spraying the nasal cavities with 4% lidocaine and nasal decongestant.   Findings:  Supraglottis: no masses or lesions  Glottis:    Structure:                        Right: crisp and shows no lesions or masses                        Left:  crisp and shows no lesions or masses                 Mobility:                        Right:  normal                        Left:  normal                Amplitude:                        Right:  normal                       Left:  normal                Closure: complete                 Wave symmetry:  symmetric  Subglottis: no masses or lesions within the visualized subglottis Visualized airway is widely patent.

## 2024-09-19 NOTE — PROCEDURE
[de-identified] : Stroboscopic Laryngoscopy Procedure Note:  Indication:	Assess laryngeal biomechanics and vocal fold oscillation.  Description of Procedure:	Informed consent was verbally obtained from the patient prior to the procedure. The patient was seated in the clinic chair. Topical anesthesia was achieved by first spraying the nasal cavities with 4% lidocaine and nasal decongestant.   Findings:  Supraglottis: no masses or lesions  Glottis:    Structure:                        Right: crisp and shows no lesions or masses                        Left:  crisp and shows no lesions or masses                 Mobility:                        Right:  normal                        Left:  normal                Amplitude:                        Right:  normal                       Left:  normal                Closure: complete                 Wave symmetry:  symmetric  Subglottis: no masses or lesions within the visualized subglottis Visualized airway is widely patent.

## 2024-11-19 ENCOUNTER — APPOINTMENT (OUTPATIENT)
Dept: OTOLARYNGOLOGY | Facility: CLINIC | Age: 59
End: 2024-11-19
Payer: MEDICAID

## 2024-11-19 VITALS
HEART RATE: 73 BPM | DIASTOLIC BLOOD PRESSURE: 85 MMHG | OXYGEN SATURATION: 94 % | BODY MASS INDEX: 25.05 KG/M2 | SYSTOLIC BLOOD PRESSURE: 128 MMHG | HEIGHT: 70 IN | WEIGHT: 175 LBS

## 2024-11-19 DIAGNOSIS — R49.0 DYSPHONIA: ICD-10-CM

## 2024-11-19 DIAGNOSIS — J37.0 CHRONIC LARYNGITIS: ICD-10-CM

## 2024-11-19 DIAGNOSIS — R05.3 CHRONIC COUGH: ICD-10-CM

## 2024-11-19 PROCEDURE — 99213 OFFICE O/P EST LOW 20 MIN: CPT | Mod: 25

## 2024-11-19 PROCEDURE — 31579 LARYNGOSCOPY TELESCOPIC: CPT

## 2025-01-21 ENCOUNTER — APPOINTMENT (OUTPATIENT)
Dept: OTOLARYNGOLOGY | Facility: CLINIC | Age: 60
End: 2025-01-21
Payer: MEDICAID

## 2025-01-21 DIAGNOSIS — J37.0 CHRONIC LARYNGITIS: ICD-10-CM

## 2025-01-21 DIAGNOSIS — B49 CHRONIC LARYNGITIS: ICD-10-CM

## 2025-01-21 DIAGNOSIS — J45.40 MODERATE PERSISTENT ASTHMA, UNCOMPLICATED: ICD-10-CM

## 2025-01-21 PROCEDURE — 31575 DIAGNOSTIC LARYNGOSCOPY: CPT

## 2025-01-21 PROCEDURE — 99213 OFFICE O/P EST LOW 20 MIN: CPT | Mod: 25

## 2025-03-25 ENCOUNTER — APPOINTMENT (OUTPATIENT)
Dept: OTOLARYNGOLOGY | Facility: CLINIC | Age: 60
End: 2025-03-25